# Patient Record
Sex: FEMALE | Race: WHITE | Employment: FULL TIME | URBAN - METROPOLITAN AREA
[De-identification: names, ages, dates, MRNs, and addresses within clinical notes are randomized per-mention and may not be internally consistent; named-entity substitution may affect disease eponyms.]

---

## 2017-08-17 PROBLEM — K80.50 CHOLEDOCHOLITHIASIS: Status: ACTIVE | Noted: 2017-08-17

## 2017-08-17 PROBLEM — K83.1 COMMON BILE DUCT (CBD) STRICTURE: Status: ACTIVE | Noted: 2017-08-17

## 2017-08-17 PROBLEM — K29.60 SUPERFICIAL GASTRITIS: Status: ACTIVE | Noted: 2017-08-17

## 2018-09-13 ENCOUNTER — APPOINTMENT (OUTPATIENT)
Dept: CT IMAGING | Age: 58
DRG: 440 | End: 2018-09-13
Payer: COMMERCIAL

## 2018-09-13 ENCOUNTER — HOSPITAL ENCOUNTER (INPATIENT)
Age: 58
LOS: 6 days | Discharge: HOME | DRG: 440 | End: 2018-09-19
Attending: EMERGENCY MEDICINE | Admitting: INTERNAL MEDICINE
Payer: COMMERCIAL

## 2018-09-13 DIAGNOSIS — K85.82 OTHER ACUTE PANCREATITIS WITH INFECTED NECROSIS: Primary | ICD-10-CM

## 2018-09-13 PROBLEM — K85.90 ACUTE RECURRENT PANCREATITIS: Status: ACTIVE | Noted: 2018-09-13

## 2018-09-13 PROBLEM — K86.1 CHRONIC RELAPSING PANCREATITIS (HCC): Status: ACTIVE | Noted: 2018-09-13

## 2018-09-13 LAB
ALBUMIN SERPL-MCNC: 4.4 G/DL (ref 3.5–5.2)
ALP BLD-CCNC: 74 U/L (ref 35–104)
ALT SERPL-CCNC: 11 U/L (ref 0–32)
AMORPHOUS: NORMAL
AMYLASE: 103 U/L (ref 20–100)
ANION GAP SERPL CALCULATED.3IONS-SCNC: 10 MMOL/L (ref 7–16)
AST SERPL-CCNC: 18 U/L (ref 0–31)
BACTERIA: NORMAL /HPF
BASOPHILS ABSOLUTE: 0.07 E9/L (ref 0–0.2)
BASOPHILS RELATIVE PERCENT: 0.8 % (ref 0–2)
BILIRUB SERPL-MCNC: 0.5 MG/DL (ref 0–1.2)
BILIRUBIN URINE: NEGATIVE
BLOOD, URINE: NEGATIVE
BUN BLDV-MCNC: 15 MG/DL (ref 6–20)
CALCIUM SERPL-MCNC: 10 MG/DL (ref 8.6–10.2)
CHLORIDE BLD-SCNC: 102 MMOL/L (ref 98–107)
CLARITY: NORMAL
CO2: 27 MMOL/L (ref 22–29)
COLOR: YELLOW
CREAT SERPL-MCNC: 1 MG/DL (ref 0.5–1)
EOSINOPHILS ABSOLUTE: 0.2 E9/L (ref 0.05–0.5)
EOSINOPHILS RELATIVE PERCENT: 2.2 % (ref 0–6)
FOLATE: 10.4 NG/ML (ref 4.8–24.2)
GFR AFRICAN AMERICAN: >60
GFR NON-AFRICAN AMERICAN: 57 ML/MIN/1.73
GLUCOSE BLD-MCNC: 89 MG/DL (ref 74–109)
GLUCOSE URINE: NEGATIVE MG/DL
HCT VFR BLD CALC: 43 % (ref 34–48)
HEMOGLOBIN: 14.5 G/DL (ref 11.5–15.5)
IMMATURE GRANULOCYTES #: 0.04 E9/L
IMMATURE GRANULOCYTES %: 0.4 % (ref 0–5)
KETONES, URINE: NEGATIVE MG/DL
LACTIC ACID: 0.7 MMOL/L (ref 0.5–2.2)
LEUKOCYTE ESTERASE, URINE: NEGATIVE
LIPASE: 237 U/L (ref 13–60)
LYMPHOCYTES ABSOLUTE: 3.78 E9/L (ref 1.5–4)
LYMPHOCYTES RELATIVE PERCENT: 41.3 % (ref 20–42)
MCH RBC QN AUTO: 31.7 PG (ref 26–35)
MCHC RBC AUTO-ENTMCNC: 33.7 % (ref 32–34.5)
MCV RBC AUTO: 94.1 FL (ref 80–99.9)
METER GLUCOSE: 85 MG/DL (ref 70–110)
MONOCYTES ABSOLUTE: 0.49 E9/L (ref 0.1–0.95)
MONOCYTES RELATIVE PERCENT: 5.3 % (ref 2–12)
NEUTROPHILS ABSOLUTE: 4.58 E9/L (ref 1.8–7.3)
NEUTROPHILS RELATIVE PERCENT: 50 % (ref 43–80)
NITRITE, URINE: NEGATIVE
PDW BLD-RTO: 13.4 FL (ref 11.5–15)
PH UA: 7.5 (ref 5–9)
PHOSPHORUS: 3.3 MG/DL (ref 2.5–4.5)
PLATELET # BLD: 236 E9/L (ref 130–450)
PMV BLD AUTO: 9.9 FL (ref 7–12)
POTASSIUM SERPL-SCNC: 4.6 MMOL/L (ref 3.5–5)
PRO-BNP: 20 PG/ML (ref 0–125)
PROTEIN UA: NEGATIVE MG/DL
RBC # BLD: 4.57 E12/L (ref 3.5–5.5)
RBC UA: NORMAL /HPF (ref 0–2)
SODIUM BLD-SCNC: 139 MMOL/L (ref 132–146)
SPECIFIC GRAVITY UA: 1.01 (ref 1–1.03)
TOTAL PROTEIN: 7.3 G/DL (ref 6.4–8.3)
UROBILINOGEN, URINE: 0.2 E.U./DL
VITAMIN B-12: 867 PG/ML (ref 211–946)
WBC # BLD: 9.2 E9/L (ref 4.5–11.5)
WBC UA: NORMAL /HPF (ref 0–5)

## 2018-09-13 PROCEDURE — 80053 COMPREHEN METABOLIC PANEL: CPT

## 2018-09-13 PROCEDURE — 85025 COMPLETE CBC W/AUTO DIFF WBC: CPT

## 2018-09-13 PROCEDURE — 6360000002 HC RX W HCPCS: Performed by: EMERGENCY MEDICINE

## 2018-09-13 PROCEDURE — 87040 BLOOD CULTURE FOR BACTERIA: CPT

## 2018-09-13 PROCEDURE — 36415 COLL VENOUS BLD VENIPUNCTURE: CPT

## 2018-09-13 PROCEDURE — 83690 ASSAY OF LIPASE: CPT

## 2018-09-13 PROCEDURE — 82607 VITAMIN B-12: CPT

## 2018-09-13 PROCEDURE — 82962 GLUCOSE BLOOD TEST: CPT

## 2018-09-13 PROCEDURE — 6360000002 HC RX W HCPCS: Performed by: INTERNAL MEDICINE

## 2018-09-13 PROCEDURE — 6370000000 HC RX 637 (ALT 250 FOR IP): Performed by: INTERNAL MEDICINE

## 2018-09-13 PROCEDURE — 1200000000 HC SEMI PRIVATE

## 2018-09-13 PROCEDURE — 96376 TX/PRO/DX INJ SAME DRUG ADON: CPT

## 2018-09-13 PROCEDURE — 6360000004 HC RX CONTRAST MEDICATION: Performed by: RADIOLOGY

## 2018-09-13 PROCEDURE — 96374 THER/PROPH/DIAG INJ IV PUSH: CPT

## 2018-09-13 PROCEDURE — 82746 ASSAY OF FOLIC ACID SERUM: CPT

## 2018-09-13 PROCEDURE — 81001 URINALYSIS AUTO W/SCOPE: CPT

## 2018-09-13 PROCEDURE — 84100 ASSAY OF PHOSPHORUS: CPT

## 2018-09-13 PROCEDURE — 83880 ASSAY OF NATRIURETIC PEPTIDE: CPT

## 2018-09-13 PROCEDURE — 74177 CT ABD & PELVIS W/CONTRAST: CPT

## 2018-09-13 PROCEDURE — 96375 TX/PRO/DX INJ NEW DRUG ADDON: CPT

## 2018-09-13 PROCEDURE — 2580000003 HC RX 258: Performed by: INTERNAL MEDICINE

## 2018-09-13 PROCEDURE — 82150 ASSAY OF AMYLASE: CPT

## 2018-09-13 PROCEDURE — 99285 EMERGENCY DEPT VISIT HI MDM: CPT

## 2018-09-13 PROCEDURE — 83605 ASSAY OF LACTIC ACID: CPT

## 2018-09-13 RX ORDER — DICYCLOMINE HYDROCHLORIDE 10 MG/1
20 CAPSULE ORAL 3 TIMES DAILY PRN
Status: DISCONTINUED | OUTPATIENT
Start: 2018-09-13 | End: 2018-09-19 | Stop reason: HOSPADM

## 2018-09-13 RX ORDER — DEXTROSE MONOHYDRATE 50 MG/ML
100 INJECTION, SOLUTION INTRAVENOUS PRN
Status: DISCONTINUED | OUTPATIENT
Start: 2018-09-13 | End: 2018-09-19 | Stop reason: HOSPADM

## 2018-09-13 RX ORDER — NICOTINE POLACRILEX 4 MG
15 LOZENGE BUCCAL PRN
Status: DISCONTINUED | OUTPATIENT
Start: 2018-09-13 | End: 2018-09-19 | Stop reason: HOSPADM

## 2018-09-13 RX ORDER — SODIUM CHLORIDE 9 MG/ML
INJECTION, SOLUTION INTRAVENOUS CONTINUOUS
Status: DISCONTINUED | OUTPATIENT
Start: 2018-09-13 | End: 2018-09-19 | Stop reason: HOSPADM

## 2018-09-13 RX ORDER — ONDANSETRON 2 MG/ML
4 INJECTION INTRAMUSCULAR; INTRAVENOUS ONCE
Status: COMPLETED | OUTPATIENT
Start: 2018-09-13 | End: 2018-09-13

## 2018-09-13 RX ORDER — FENTANYL CITRATE 50 UG/ML
50 INJECTION, SOLUTION INTRAMUSCULAR; INTRAVENOUS ONCE
Status: COMPLETED | OUTPATIENT
Start: 2018-09-13 | End: 2018-09-13

## 2018-09-13 RX ORDER — MORPHINE SULFATE 2 MG/ML
2 INJECTION, SOLUTION INTRAMUSCULAR; INTRAVENOUS
Status: DISCONTINUED | OUTPATIENT
Start: 2018-09-13 | End: 2018-09-19 | Stop reason: HOSPADM

## 2018-09-13 RX ORDER — DEXTROSE MONOHYDRATE 25 G/50ML
12.5 INJECTION, SOLUTION INTRAVENOUS PRN
Status: DISCONTINUED | OUTPATIENT
Start: 2018-09-13 | End: 2018-09-19 | Stop reason: HOSPADM

## 2018-09-13 RX ORDER — ONDANSETRON 2 MG/ML
4 INJECTION INTRAMUSCULAR; INTRAVENOUS EVERY 6 HOURS PRN
Status: DISCONTINUED | OUTPATIENT
Start: 2018-09-13 | End: 2018-09-19 | Stop reason: HOSPADM

## 2018-09-13 RX ORDER — FENTANYL CITRATE 50 UG/ML
50 INJECTION, SOLUTION INTRAMUSCULAR; INTRAVENOUS EVERY 4 HOURS PRN
Status: DISCONTINUED | OUTPATIENT
Start: 2018-09-13 | End: 2018-09-13

## 2018-09-13 RX ORDER — NICOTINE 21 MG/24HR
1 PATCH, TRANSDERMAL 24 HOURS TRANSDERMAL DAILY
Status: DISCONTINUED | OUTPATIENT
Start: 2018-09-13 | End: 2018-09-19 | Stop reason: HOSPADM

## 2018-09-13 RX ORDER — ONDANSETRON 4 MG/1
4 TABLET, ORALLY DISINTEGRATING ORAL EVERY 8 HOURS PRN
Status: DISCONTINUED | OUTPATIENT
Start: 2018-09-13 | End: 2018-09-19 | Stop reason: HOSPADM

## 2018-09-13 RX ORDER — PANTOPRAZOLE SODIUM 40 MG/1
40 TABLET, DELAYED RELEASE ORAL DAILY
Status: DISCONTINUED | OUTPATIENT
Start: 2018-09-13 | End: 2018-09-14

## 2018-09-13 RX ADMIN — PANTOPRAZOLE SODIUM 40 MG: 40 TABLET, DELAYED RELEASE ORAL at 18:58

## 2018-09-13 RX ADMIN — FENTANYL CITRATE 50 MCG: 50 INJECTION, SOLUTION INTRAMUSCULAR; INTRAVENOUS at 18:57

## 2018-09-13 RX ADMIN — ONDANSETRON HYDROCHLORIDE 4 MG: 2 INJECTION, SOLUTION INTRAMUSCULAR; INTRAVENOUS at 18:57

## 2018-09-13 RX ADMIN — ENOXAPARIN SODIUM 40 MG: 40 INJECTION SUBCUTANEOUS at 18:58

## 2018-09-13 RX ADMIN — MORPHINE SULFATE 2 MG: 2 INJECTION, SOLUTION INTRAMUSCULAR; INTRAVENOUS at 21:19

## 2018-09-13 RX ADMIN — FENTANYL CITRATE 50 MCG: 50 INJECTION INTRAMUSCULAR; INTRAVENOUS at 15:38

## 2018-09-13 RX ADMIN — MORPHINE SULFATE 2 MG: 2 INJECTION, SOLUTION INTRAMUSCULAR; INTRAVENOUS at 23:31

## 2018-09-13 RX ADMIN — IOPAMIDOL 110 ML: 755 INJECTION, SOLUTION INTRAVENOUS at 17:20

## 2018-09-13 RX ADMIN — FENTANYL CITRATE 50 MCG: 50 INJECTION, SOLUTION INTRAMUSCULAR; INTRAVENOUS at 17:08

## 2018-09-13 RX ADMIN — SODIUM CHLORIDE: 9 INJECTION, SOLUTION INTRAVENOUS at 18:58

## 2018-09-13 RX ADMIN — ONDANSETRON 4 MG: 2 INJECTION INTRAMUSCULAR; INTRAVENOUS at 15:38

## 2018-09-13 ASSESSMENT — PAIN DESCRIPTION - PAIN TYPE
TYPE: ACUTE PAIN

## 2018-09-13 ASSESSMENT — PAIN DESCRIPTION - ORIENTATION
ORIENTATION: LEFT;UPPER

## 2018-09-13 ASSESSMENT — PAIN DESCRIPTION - LOCATION
LOCATION: ABDOMEN

## 2018-09-13 ASSESSMENT — PAIN DESCRIPTION - PROGRESSION
CLINICAL_PROGRESSION: NOT CHANGED

## 2018-09-13 ASSESSMENT — PAIN DESCRIPTION - ONSET
ONSET: ON-GOING

## 2018-09-13 ASSESSMENT — PAIN SCALES - GENERAL
PAINLEVEL_OUTOF10: 7
PAINLEVEL_OUTOF10: 7
PAINLEVEL_OUTOF10: 10
PAINLEVEL_OUTOF10: 7
PAINLEVEL_OUTOF10: 7

## 2018-09-13 ASSESSMENT — PAIN DESCRIPTION - FREQUENCY
FREQUENCY: CONTINUOUS
FREQUENCY: INTERMITTENT
FREQUENCY: CONTINUOUS

## 2018-09-13 ASSESSMENT — PAIN DESCRIPTION - DESCRIPTORS
DESCRIPTORS: ACHING;DISCOMFORT
DESCRIPTORS: ACHING;DISCOMFORT;SORE
DESCRIPTORS: ACHING;DISCOMFORT

## 2018-09-13 NOTE — ED PROVIDER NOTES
Department of Emergency Medicine   ED  Provider Note  Admit Date/RoomTime: 9/13/2018  2:13 PM  ED Room: 0511/0511-A    HPI:   Beatrice Muhammad is a 66 y.o. female presenting to the ED for abd pain, beginning 2 days ago. The complaint has been constant, moderate in severity, and worsened by nothing. Pt states the pain is focal to her LUQ and does not radiate. She also c/o nausea. She denies HA, chest pain, SOB, fever, chills, emesis, diarrhea, dysuria, hematuria, back pain, and melena. Pt follows with Dr. Vargas Aguirre for GI.     ROS:   Pertinent positives and negatives are stated within HPI, all other systems reviewed and are negative.    --------------------------------------------- PAST HISTORY ---------------------------------------------  Past Medical History:  has a past medical history of Livingston's esophagus; CAD (coronary artery disease); Cancer ([facility] [address] Diverticulitis of sigmoid colon; Elevated lipase; Gastric ulcer; GERD (gastroesophageal reflux disease); Hypoglycemia ; MI (myocardial infarction) ([facility] [address] Peptic ulcer disease; Relapsing pancreatitis ([facility] [address] Thyroid disease; and Ulcer. Past Surgical History:  has a past surgical history that includes Hysterectomy (age 27); Cardiac surgery; ECHO Complete 2D W Doppler W Color (1/16/2013); Coronary angioplasty; [place] tooth extraction; Endoscopy, colon, diagnostic; Colonoscopy (05/2014); Cholecystectomy (2014); Upper gastrointestinal endoscopy (08/14/2017); ERCP (08/2017); and partial nephrectomy (1991). Social History:  reports that she has been smoking Cigarettes. She started smoking about 36 years ago. She has a 40.00 pack-year smoking history. She has never used smokeless tobacco. She reports that she drinks alcohol. She reports that she does not use drugs. Family History: family history includes Cancer in her father; Diabetes in her mother; [place] in her father; Thyroid Disease in her mother.      The patients home medications have been reviewed. Allergies: Darvocet [propoxyphene n-acetaminophen]; Darvon [propoxyphene hcl];  Percocet [oxycodone-acetaminophen]; and Dilaudid [hydromorphone hcl]    -------------------------------------------------- RESULTS -------------------------------------------------  All laboratory and radiology results have been personally reviewed by myself   LABS:  Results for orders placed or performed during the hospital encounter of 09/13/18   Culture Blood #1   Result Value Ref Range    Blood Culture, Routine 5 Days- no growth    CBC Auto Differential   Result Value Ref Range    WBC 9.2 4.5 - 11.5 E9/L    RBC 4.57 3.50 - 5.50 E12/L    Hemoglobin 14.5 11.5 - 15.5 g/dL    Hematocrit 43.0 34.0 - 48.0 %    MCV 94.1 80.0 - 99.9 fL    MCH 31.7 26.0 - 35.0 pg    MCHC 33.7 32.0 - 34.5 %    RDW 13.4 11.5 - 15.0 fL    Platelets 784 592 - 846 E9/L    MPV 9.9 7.0 - 12.0 fL    Neutrophils % 50.0 43.0 - 80.0 %    Immature Granulocytes % 0.4 0.0 - 5.0 %    Lymphocytes % 41.3 20.0 - 42.0 %    Monocytes % 5.3 2.0 - 12.0 %    Eosinophils % 2.2 0.0 - 6.0 %    Basophils % 0.8 0.0 - 2.0 %    Neutrophils # 4.58 1.80 - 7.30 E9/L    Immature Granulocytes # 0.04 E9/L    Lymphocytes # 3.78 1.50 - 4.00 E9/L    Monocytes # 0.49 0.10 - 0.95 E9/L    Eosinophils # 0.20 0.05 - 0.50 E9/L    Basophils # 0.07 0.00 - 0.20 E9/L   Comprehensive Metabolic Panel   Result Value Ref Range    Sodium 139 132 - 146 mmol/L    Potassium 4.6 3.5 - 5.0 mmol/L    Chloride 102 98 - 107 mmol/L    CO2 27 22 - 29 mmol/L    Anion Gap 10 7 - 16 mmol/L    Glucose 89 74 - 109 mg/dL    BUN 15 6 - 20 mg/dL    CREATININE 1.0 0.5 - 1.0 mg/dL    GFR Non-African American 57 >=60 mL/min/1.73    GFR African American >60     Calcium 10.0 8.6 - 10.2 mg/dL    Total Protein 7.3 6.4 - 8.3 g/dL    Alb 4.4 3.5 - 5.2 g/dL    Total Bilirubin 0.5 0.0 - 1.2 mg/dL    Alkaline Phosphatase 74 35 - 104 U/L    ALT 11 0 - 32 U/L    AST 18 0 - 31 U/L   Lipase   Result Value Ref Range    Lipase 237 (H) 0.5 0.0 - 1.2 mg/dL    Alkaline Phosphatase 65 35 - 104 U/L    ALT 10 0 - 32 U/L    AST 14 0 - 31 U/L   CBC Auto Differential   Result Value Ref Range    WBC 7.6 4.5 - 11.5 E9/L    RBC 4.06 3.50 - 5.50 E12/L    Hemoglobin 12.9 11.5 - 15.5 g/dL    Hematocrit 38.4 34.0 - 48.0 %    MCV 94.6 80.0 - 99.9 fL    MCH 31.8 26.0 - 35.0 pg    MCHC 33.6 32.0 - 34.5 %    RDW 12.9 11.5 - 15.0 fL    Platelets 005 883 - 842 E9/L    MPV 10.1 7.0 - 12.0 fL    Neutrophils % 59.6 43.0 - 80.0 %    Immature Granulocytes % 0.8 0.0 - 5.0 %    Lymphocytes % 31.6 20.0 - 42.0 %    Monocytes % 4.7 2.0 - 12.0 %    Eosinophils % 2.6 0.0 - 6.0 %    Basophils % 0.7 0.0 - 2.0 %    Neutrophils # 4.52 1.80 - 7.30 E9/L    Immature Granulocytes # 0.06 E9/L    Lymphocytes # 2.40 1.50 - 4.00 E9/L    Monocytes # 0.36 0.10 - 0.95 E9/L    Eosinophils # 0.20 0.05 - 0.50 E9/L    Basophils # 0.05 0.00 - 0.20 E9/L   Amylase   Result Value Ref Range    Amylase 71 20 - 100 U/L   C-Reactive Protein   Result Value Ref Range    CRP 0.3 0.0 - 0.4 mg/dL   Lipase   Result Value Ref Range    Lipase 19 13 - 60 U/L   Comprehensive metabolic panel   Result Value Ref Range    Sodium 139 132 - 146 mmol/L    Potassium 4.2 3.5 - 5.0 mmol/L    Chloride 107 98 - 107 mmol/L    CO2 24 22 - 29 mmol/L    Anion Gap 8 7 - 16 mmol/L    Glucose 88 74 - 109 mg/dL    BUN 8 6 - 20 mg/dL    CREATININE 0.9 0.5 - 1.0 mg/dL    GFR Non-African American >60 >=60 mL/min/1.73    GFR African American >60     Calcium 8.3 (L) 8.6 - 10.2 mg/dL    Total Protein 5.7 (L) 6.4 - 8.3 g/dL    Alb 3.2 (L) 3.5 - 5.2 g/dL    Total Bilirubin 0.4 0.0 - 1.2 mg/dL    Alkaline Phosphatase 59 35 - 104 U/L    ALT 10 0 - 32 U/L    AST 16 0 - 31 U/L   CBC Auto Differential   Result Value Ref Range    WBC 6.4 4.5 - 11.5 E9/L    RBC 3.91 3.50 - 5.50 E12/L    Hemoglobin 12.4 11.5 - 15.5 g/dL    Hematocrit 36.5 34.0 - 48.0 %    MCV 93.4 80.0 - 99.9 fL    MCH 31.7 26.0 - 35.0 pg    MCHC 34.0 32.0 - 34.5 %    RDW 13.0 11.5 - Eosinophils # 0.20 0.05 - 0.50 E9/L    Basophils # 0.03 0.00 - 0.20 E9/L   C-Reactive Protein   Result Value Ref Range    CRP 1.4 (H) 0.0 - 0.4 mg/dL   Lipase   Result Value Ref Range    Lipase 11 (L) 13 - 60 U/L   Comprehensive metabolic panel   Result Value Ref Range    Sodium 143 132 - 146 mmol/L    Potassium 4.0 3.5 - 5.0 mmol/L    Chloride 107 98 - 107 mmol/L    CO2 26 22 - 29 mmol/L    Anion Gap 10 7 - 16 mmol/L    Glucose 84 74 - 109 mg/dL    BUN 5 (L) 6 - 20 mg/dL    CREATININE 0.8 0.5 - 1.0 mg/dL    GFR Non-African American >60 >=60 mL/min/1.73    GFR African American >60     Calcium 8.7 8.6 - 10.2 mg/dL    Total Protein 5.9 (L) 6.4 - 8.3 g/dL    Alb 3.3 (L) 3.5 - 5.2 g/dL    Total Bilirubin 0.5 0.0 - 1.2 mg/dL    Alkaline Phosphatase 62 35 - 104 U/L    ALT 12 0 - 32 U/L    AST 14 0 - 31 U/L   CBC Auto Differential   Result Value Ref Range    WBC 7.1 4.5 - 11.5 E9/L    RBC 3.92 3.50 - 5.50 E12/L    Hemoglobin 12.4 11.5 - 15.5 g/dL    Hematocrit 36.4 34.0 - 48.0 %    MCV 92.9 80.0 - 99.9 fL    MCH 31.6 26.0 - 35.0 pg    MCHC 34.1 32.0 - 34.5 %    RDW 13.0 11.5 - 15.0 fL    Platelets 440 696 - 453 E9/L    MPV 10.1 7.0 - 12.0 fL    Neutrophils % 58.3 43.0 - 80.0 %    Immature Granulocytes % 0.3 0.0 - 5.0 %    Lymphocytes % 32.6 20.0 - 42.0 %    Monocytes % 4.8 2.0 - 12.0 %    Eosinophils % 3.4 0.0 - 6.0 %    Basophils % 0.6 0.0 - 2.0 %    Neutrophils # 4.13 1.80 - 7.30 E9/L    Immature Granulocytes # 0.02 E9/L    Lymphocytes # 2.31 1.50 - 4.00 E9/L    Monocytes # 0.34 0.10 - 0.95 E9/L    Eosinophils # 0.24 0.05 - 0.50 E9/L    Basophils # 0.04 0.00 - 0.20 E9/L   POCT Glucose   Result Value Ref Range    Meter Glucose 85 70 - 110 mg/dL   POCT Glucose   Result Value Ref Range    Meter Glucose 82 70 - 110 mg/dL   POCT Glucose   Result Value Ref Range    Meter Glucose 74 70 - 110 mg/dL   POCT Glucose   Result Value Ref Range    Meter Glucose 80 70 - 110 mg/dL   POCT Glucose   Result Value Ref Range    Meter No definite enhancing lesion   is seen                CT ABDOMEN PELVIS W IV CONTRAST Additional Contrast? None   Final Result      Dilatation of the intrahepatic, extrahepatic ducts, common bile duct   and the pancreatic duct. Please correlate with LFTs and consider   further evaluation as clinically warranted. No peripancreatic fluid collections or edema this time on this   examination.        ------------------------- NURSING NOTES AND VITALS REVIEWED ---------------------------   The nursing notes within the ED encounter and vital signs as below have been reviewed. /72   Pulse 71   Temp 98.5 °F (36.9 °C) (Oral)   Resp 16   Ht 5' 4\" (1.626 m)   Wt 147 lb 4 oz (66.8 kg)   SpO2 94%   BMI 25.28 kg/m²   Oxygen Saturation Interpretation: Normal    ---------------------------------------------------PHYSICAL EXAM--------------------------------------    Constitutional/General: Alert and oriented x3, well appearing, non toxic in NAD. Afebrile. Head: NC/AT  Eyes: PERRL, EOMI  HENT: Oropharynx clear. Handling secretions. Neck: Supple, full ROM  Pulmonary: Lungs clear to auscultation bilaterally, no wheezes, rales, or rhonchi. Not in respiratory distress. Cardiovascular: Regular rate. Regular rhythm. No murmurs. No gallops, or rubs. 2+ distal pulses. Abdomen: Soft, epigastric and LUQ tenderness, non distended. No guarding, rebound, or rigidity. Extremities: Moves all extremities x 4. Warm and well perfused. No edema. Skin: Warm and dry without rash. Back: No CVA tenderness. Neurologic: GCS 15, no focal deficits, systemic strength 5/5 to all extremities symmetrically, CN II-XII grossly intact.   Psych: Speech and behavior appropriate.     ------------------------------ ED COURSE/MEDICAL DECISION MAKING----------------------  Medications   LORazepam (ATIVAN) injection 1 mg (1 mg Intravenous Not Given 9/14/18 1827)   ondansetron (ZOFRAN) injection 4 mg (4 mg Intravenous Given 9/13/18 5668)

## 2018-09-14 ENCOUNTER — APPOINTMENT (OUTPATIENT)
Dept: MRI IMAGING | Age: 58
DRG: 440 | End: 2018-09-14
Payer: COMMERCIAL

## 2018-09-14 LAB
ALBUMIN SERPL-MCNC: 3.3 G/DL (ref 3.5–5.2)
ALP BLD-CCNC: 61 U/L (ref 35–104)
ALT SERPL-CCNC: 10 U/L (ref 0–32)
ANION GAP SERPL CALCULATED.3IONS-SCNC: 10 MMOL/L (ref 7–16)
AST SERPL-CCNC: 13 U/L (ref 0–31)
BASOPHILS ABSOLUTE: 0.07 E9/L (ref 0–0.2)
BASOPHILS RELATIVE PERCENT: 0.9 % (ref 0–2)
BILIRUB SERPL-MCNC: 0.5 MG/DL (ref 0–1.2)
BUN BLDV-MCNC: 14 MG/DL (ref 6–20)
C-REACTIVE PROTEIN: 0.2 MG/DL (ref 0–0.4)
CALCIUM SERPL-MCNC: 8.6 MG/DL (ref 8.6–10.2)
CHLORIDE BLD-SCNC: 105 MMOL/L (ref 98–107)
CHOLESTEROL, TOTAL: 139 MG/DL (ref 0–199)
CO2: 23 MMOL/L (ref 22–29)
CREAT SERPL-MCNC: 1 MG/DL (ref 0.5–1)
EOSINOPHILS ABSOLUTE: 0.24 E9/L (ref 0.05–0.5)
EOSINOPHILS RELATIVE PERCENT: 3.1 % (ref 0–6)
GFR AFRICAN AMERICAN: >60
GFR NON-AFRICAN AMERICAN: 57 ML/MIN/1.73
GLUCOSE BLD-MCNC: 83 MG/DL (ref 74–109)
HCT VFR BLD CALC: 38.2 % (ref 34–48)
HDLC SERPL-MCNC: 30 MG/DL
HEMOGLOBIN: 12.8 G/DL (ref 11.5–15.5)
IMMATURE GRANULOCYTES #: 0.03 E9/L
IMMATURE GRANULOCYTES %: 0.4 % (ref 0–5)
LDL CHOLESTEROL CALCULATED: 84 MG/DL (ref 0–99)
LIPASE: 24 U/L (ref 13–60)
LYMPHOCYTES ABSOLUTE: 4.07 E9/L (ref 1.5–4)
LYMPHOCYTES RELATIVE PERCENT: 52.2 % (ref 20–42)
MAGNESIUM: 2.4 MG/DL (ref 1.6–2.6)
MCH RBC QN AUTO: 32 PG (ref 26–35)
MCHC RBC AUTO-ENTMCNC: 33.5 % (ref 32–34.5)
MCV RBC AUTO: 95.5 FL (ref 80–99.9)
METER GLUCOSE: 74 MG/DL (ref 70–110)
METER GLUCOSE: 80 MG/DL (ref 70–110)
METER GLUCOSE: 82 MG/DL (ref 70–110)
MONOCYTES ABSOLUTE: 0.39 E9/L (ref 0.1–0.95)
MONOCYTES RELATIVE PERCENT: 5 % (ref 2–12)
NEUTROPHILS ABSOLUTE: 3 E9/L (ref 1.8–7.3)
NEUTROPHILS RELATIVE PERCENT: 38.4 % (ref 43–80)
PDW BLD-RTO: 13.5 FL (ref 11.5–15)
PLATELET # BLD: 211 E9/L (ref 130–450)
PMV BLD AUTO: 10.1 FL (ref 7–12)
POTASSIUM SERPL-SCNC: 3.9 MMOL/L (ref 3.5–5)
RBC # BLD: 4 E12/L (ref 3.5–5.5)
SODIUM BLD-SCNC: 138 MMOL/L (ref 132–146)
TOTAL PROTEIN: 6 G/DL (ref 6.4–8.3)
TRIGL SERPL-MCNC: 123 MG/DL (ref 0–149)
TSH SERPL DL<=0.05 MIU/L-ACNC: 0.9 UIU/ML (ref 0.27–4.2)
VLDLC SERPL CALC-MCNC: 25 MG/DL
WBC # BLD: 7.8 E9/L (ref 4.5–11.5)

## 2018-09-14 PROCEDURE — 36415 COLL VENOUS BLD VENIPUNCTURE: CPT

## 2018-09-14 PROCEDURE — 74183 MRI ABD W/O CNTR FLWD CNTR: CPT

## 2018-09-14 PROCEDURE — 6360000002 HC RX W HCPCS: Performed by: INTERNAL MEDICINE

## 2018-09-14 PROCEDURE — 6370000000 HC RX 637 (ALT 250 FOR IP): Performed by: INTERNAL MEDICINE

## 2018-09-14 PROCEDURE — 85025 COMPLETE CBC W/AUTO DIFF WBC: CPT

## 2018-09-14 PROCEDURE — 1200000000 HC SEMI PRIVATE

## 2018-09-14 PROCEDURE — 6360000004 HC RX CONTRAST MEDICATION: Performed by: RADIOLOGY

## 2018-09-14 PROCEDURE — A9579 GAD-BASE MR CONTRAST NOS,1ML: HCPCS | Performed by: RADIOLOGY

## 2018-09-14 PROCEDURE — 80061 LIPID PANEL: CPT

## 2018-09-14 PROCEDURE — 82962 GLUCOSE BLOOD TEST: CPT

## 2018-09-14 PROCEDURE — 80053 COMPREHEN METABOLIC PANEL: CPT

## 2018-09-14 PROCEDURE — 84443 ASSAY THYROID STIM HORMONE: CPT

## 2018-09-14 PROCEDURE — 2580000003 HC RX 258: Performed by: INTERNAL MEDICINE

## 2018-09-14 PROCEDURE — 83735 ASSAY OF MAGNESIUM: CPT

## 2018-09-14 PROCEDURE — 86140 C-REACTIVE PROTEIN: CPT

## 2018-09-14 PROCEDURE — 83690 ASSAY OF LIPASE: CPT

## 2018-09-14 RX ORDER — LORAZEPAM 2 MG/ML
1 INJECTION INTRAMUSCULAR
Status: DISCONTINUED | OUTPATIENT
Start: 2018-09-15 | End: 2018-09-14

## 2018-09-14 RX ORDER — LORAZEPAM 2 MG/ML
1 INJECTION INTRAMUSCULAR
Status: COMPLETED | OUTPATIENT
Start: 2018-09-14 | End: 2018-09-14

## 2018-09-14 RX ORDER — PANTOPRAZOLE SODIUM 40 MG/1
40 TABLET, DELAYED RELEASE ORAL
Status: DISCONTINUED | OUTPATIENT
Start: 2018-09-14 | End: 2018-09-19 | Stop reason: HOSPADM

## 2018-09-14 RX ORDER — SUCRALFATE 1 G/1
1 TABLET ORAL EVERY 6 HOURS SCHEDULED
Status: DISCONTINUED | OUTPATIENT
Start: 2018-09-14 | End: 2018-09-19 | Stop reason: HOSPADM

## 2018-09-14 RX ORDER — LORAZEPAM 2 MG/ML
1 INJECTION INTRAMUSCULAR
Status: ACTIVE | OUTPATIENT
Start: 2018-09-14 | End: 2018-09-15

## 2018-09-14 RX ADMIN — LORAZEPAM 1 MG: 2 INJECTION INTRAMUSCULAR; INTRAVENOUS at 16:51

## 2018-09-14 RX ADMIN — MORPHINE SULFATE 2 MG: 2 INJECTION, SOLUTION INTRAMUSCULAR; INTRAVENOUS at 15:34

## 2018-09-14 RX ADMIN — GADOTERIDOL 12 ML: 279.3 INJECTION, SOLUTION INTRAVENOUS at 17:46

## 2018-09-14 RX ADMIN — PANTOPRAZOLE SODIUM 40 MG: 40 TABLET, DELAYED RELEASE ORAL at 18:42

## 2018-09-14 RX ADMIN — ONDANSETRON HYDROCHLORIDE 4 MG: 2 INJECTION, SOLUTION INTRAMUSCULAR; INTRAVENOUS at 03:55

## 2018-09-14 RX ADMIN — MORPHINE SULFATE 2 MG: 2 INJECTION, SOLUTION INTRAMUSCULAR; INTRAVENOUS at 18:41

## 2018-09-14 RX ADMIN — SODIUM CHLORIDE: 9 INJECTION, SOLUTION INTRAVENOUS at 04:00

## 2018-09-14 RX ADMIN — MORPHINE SULFATE 2 MG: 2 INJECTION, SOLUTION INTRAMUSCULAR; INTRAVENOUS at 11:02

## 2018-09-14 RX ADMIN — MORPHINE SULFATE 2 MG: 2 INJECTION, SOLUTION INTRAMUSCULAR; INTRAVENOUS at 22:41

## 2018-09-14 RX ADMIN — PANTOPRAZOLE SODIUM 40 MG: 40 TABLET, DELAYED RELEASE ORAL at 07:48

## 2018-09-14 RX ADMIN — SUCRALFATE 1 G: 1 TABLET ORAL at 12:02

## 2018-09-14 RX ADMIN — ONDANSETRON HYDROCHLORIDE 4 MG: 2 INJECTION, SOLUTION INTRAMUSCULAR; INTRAVENOUS at 11:02

## 2018-09-14 RX ADMIN — ENOXAPARIN SODIUM 40 MG: 40 INJECTION SUBCUTANEOUS at 07:48

## 2018-09-14 RX ADMIN — SUCRALFATE 1 G: 1 TABLET ORAL at 18:42

## 2018-09-14 RX ADMIN — MORPHINE SULFATE 2 MG: 2 INJECTION, SOLUTION INTRAMUSCULAR; INTRAVENOUS at 03:55

## 2018-09-14 RX ADMIN — MORPHINE SULFATE 2 MG: 2 INJECTION, SOLUTION INTRAMUSCULAR; INTRAVENOUS at 08:18

## 2018-09-14 RX ADMIN — ONDANSETRON HYDROCHLORIDE 4 MG: 2 INJECTION, SOLUTION INTRAMUSCULAR; INTRAVENOUS at 18:25

## 2018-09-14 ASSESSMENT — PAIN SCALES - GENERAL
PAINLEVEL_OUTOF10: 3
PAINLEVEL_OUTOF10: 8
PAINLEVEL_OUTOF10: 7
PAINLEVEL_OUTOF10: 3
PAINLEVEL_OUTOF10: 8
PAINLEVEL_OUTOF10: 7
PAINLEVEL_OUTOF10: 7
PAINLEVEL_OUTOF10: 8

## 2018-09-14 ASSESSMENT — PAIN DESCRIPTION - ONSET
ONSET: ON-GOING
ONSET: AWAKENED FROM SLEEP

## 2018-09-14 ASSESSMENT — PAIN DESCRIPTION - LOCATION
LOCATION: ABDOMEN
LOCATION: ABDOMEN;HEAD

## 2018-09-14 ASSESSMENT — PAIN DESCRIPTION - ORIENTATION
ORIENTATION: LEFT;LOWER
ORIENTATION: LEFT;LOWER;UPPER

## 2018-09-14 ASSESSMENT — PAIN DESCRIPTION - PAIN TYPE
TYPE: ACUTE PAIN
TYPE: ACUTE PAIN

## 2018-09-14 ASSESSMENT — PAIN DESCRIPTION - PROGRESSION
CLINICAL_PROGRESSION: NOT CHANGED
CLINICAL_PROGRESSION: NOT CHANGED

## 2018-09-14 ASSESSMENT — PAIN DESCRIPTION - DESCRIPTORS
DESCRIPTORS: ACHING;DISCOMFORT;HEADACHE
DESCRIPTORS: ACHING;DISCOMFORT;SORE

## 2018-09-14 ASSESSMENT — PAIN DESCRIPTION - FREQUENCY
FREQUENCY: INTERMITTENT
FREQUENCY: INTERMITTENT

## 2018-09-14 NOTE — PLAN OF CARE
Problem: Pain:  Goal: Pain level will decrease  Pain level will decrease   Outcome: Not Met This Shift      Problem: Nausea/Vomiting:  Goal: Absence of nausea/vomiting  Absence of nausea/vomiting  Outcome: Not Met This Shift

## 2018-09-14 NOTE — CONSULTS
Gastroenterology Consult Note   Manolo Shepherd NP-C with Chely Daniel M.D. Consult Note        Date of Service: 9/14/2018  Reason for Consult: CHR relapsing pancreatitis  Requesting Physician: Dr Devendra Sims:  LUQ and epigastric pain    History Obtained From:  patient, electronic medical record    HISTORY OF PRESENT ILLNESS:       Hortencia Watson is a 66 y.o. female with significant past medical history of PUD, recurrent pancreatitis, gastric ulcer, CAD with MI, diverticulitis, diverticulosis, Livingston's esophagus, GERD, hypothyroidism, renal cell CA S/P partial nephrectomy in 1991 admitted via ED for LUQ and epigastric pain. Pt reports she often gets these symptoms on and off but for the past three days they have worsened. Reports the pain \"is like a really bad pain, like something is squeezing\". Rated the pain 10+/10 with associated nausea. Reports since this happens often, I\"I know not to eat or drink\", so she made herself NPO. Does not believe she ate or drank anything that set the symptoms off. Denies fever or chills, but reports she did have night sweats for 2 days. Reports her GB was removed about 5 yrs ago. Denies recent change in medications and reports she does not take any medications. Reports her appetite had been good prior to this episode and denies recent weight loss. Usually moves her bowels daily--regular formed, brown stool without melena or hematochezia. States she is \"thinking something is wrong with my immune system\". Admits to a rash that developed while in a tanning bed and then reoccurred while lying in the sun at a pool. States the rash was biopsied and she was told she needed to be checked for Lupus. Admission labs amylase 103, lipase 237, otherwise essentially negative. CT of abdomen/pelvis demonstrated dilatation of intrahepatic, extrahepatic ducts, CBD and pancreatic duct. Consultation for chronic relapsing pancreatitis.   Pt is  known to Dr. Mariia De Dios, last seen during hospital admission of 8/2017 for evaluation of ERCP. Our office scheduled the patient for outpatient EUS which was performed on 9/11/17 at [facility], [place], operated by [facility] demonstrating no significant abnomralities at that time. ERCP with papillotomy per Dr Rick Benavidez on 8/16/17 demonstrating CBD dilatation with distal stricute almost at the intrapapillary portion of CBD; papillotomy was done and three small filling defects consistent with three small stones were removed with balloon sweeping. Plans for EUS to evaluate double duct sign. EGD per Dr Rick Benavidez 8/14/17 demonstrating Grade B GERD, GastritisRetrieved in EPIC, pt had EGD 11/2/15 with Dr. Barbara Franklin which demonstrated moderate gastritis and moderate duodenitis. Duodenum, biopsy: Duodenal mucosa with normal appearing villous architecture and changes of acute and chronic/peptic duodenitis with focal lamina propria acute inflammation, foveolar metaplasia and Brunner's gland hyperplasia. Gastric antrum, biopsy: Antral type gastric mucosa, no significant pathologic abnormality identified. Negative for intestinal metaplasia and dysplasia. Immunostain negative for Helicobacter pylori organisms. Pt states she has hx of PUD stating she had EGD with Dr. Yodit Flores in ? 2016 - found in [facility], pt had EGD with Dr. Yodit Flores 8/15/14 which showed gastric ulcer with severe inflammation, noncompliance with medication - biopsy - Stomach, site not further specified, biopsy: Reactive gastropathy with mucosal erosion and ulcer bed/site with surface fibrinopurulent inflammatory exudate. Immunostain for H pylori organisms is negative. Stomach, biopsy:  Reactive gastropathy; negative for intestinal metaplasia. Immunostain negative for Helicobacter pylori organisms. Pt had prior EGD and Colonoscopy with Dr. Yodit Flores 5/23/14 (results not in EPIC, bx result in EPIC) - Gastroesophageal junction, biopsy:  Mild chronic active inflammation with focal intestinal metaplasia; negative for dysplasia.  Immunostain (ZOFRAN) injection 4 mg, 4 mg, Intravenous, Q6H PRN  morphine (PF) injection 2 mg, 2 mg, Intravenous, Q2H PRN    Allergies:  Darvocet [propoxyphene n-acetaminophen]; Darvon [propoxyphene hcl]; Percocet [oxycodone-acetaminophen]; and Dilaudid [hydromorphone hcl]    Social History:    Tobacco:  Pt reports she smokes cigarettes of 1 ppd since age 15. Alcohol:  Pt reports she drinks \"a couple beers a couple times a week. 6 beers is my maximum\"  Illicit Drugs: Pt reports she does not use illicit drugs. Family History: Mother , Sepsis; she had DM2, HTN, and Thyroid disease  Father , Lung CA - he was a smoker; had partial esophagus removed but from unknown cause  1 Brother living and healthy. 3 Daughters living; one daughter with Hx of diverticulitis S/P partial stomach and bowel removal.    REVIEW OF SYSTEMS:    Aside from what was mentioned in the PMH and HPI, essentially unremarkable, all others negative. PHYSICAL EXAM:      Vitals:    BP (!) 99/55   Pulse 66   Temp 98.4 °F (36.9 °C) (Oral)   Resp 16   Ht 5' 4\" (1.626 m)   Wt 135 lb 12.8 oz (61.6 kg)   SpO2 97%   BMI 23.31 kg/m²       CONSTITUTIONAL:  awake, alert, cooperative, no apparent distress, and appears stated age  EYES:  pupils equal, round and reactive to light, sclera anicteric and conjunctiva normal  ENT:  normocephalic, oral pharynx with moist mucous membranes  NECK:  supple   LUNGS:  No increased work of breathing, good air exchange, clear to auscultation bilaterally.   CARDIOVASCULAR:  Normal apical impulse, regular rate and rhythm, no murmur noted; 2+ pulses; without edema  ABDOMEN:   bowel sounds hypoactive, soft, non-distended, tender to palpation of epigastric and LUQ without guarding or rebound, no masses palpated, no hepatosplenomegally  MUSCULOSKELETAL:  full range of motion noted  motor strength is 5 out of 5 all extremities bilaterally  NEUROLOGIC:  Mental Status Exam:  Level of Alertness:   awake  Orientation: 2018     FOLATE:    Lab Results   Component Value Date    FOLATE 10.4 2018     BARRY:    Lab Results   Component Value Date    BARRY NEGATIVE 2016     No components found for: CHLPL  Lab Results   Component Value Date    TRIG 123 2018    TRIG 166 (H) 2016    TRIG 113 2013     Lab Results   Component Value Date    HDL 30 2018    HDL 27 2016    HDL 36.9 (A) 2013     Lab Results   Component Value Date    LDLCALC 84 2018    LDLCALC 138 (H) 2016    LDLCALC 149 (H) 2013     Lab Results   Component Value Date    LABVLDL 25 2018    LABVLDL 33 2016        Ct Abdomen Pelvis W Iv Contrast Additional Contrast? None    Result Date: 2018  Patient MRN:  [id number] : 1960 Age: 66 years Gender: Female EXAM: CT ABDOMEN PELVIS W IV CONTRAST INDICATION:  abdominal pain  COMPARISON: 2017 TECHNIQUE: Low-dose CT  acquisition technique included one of following options: 1 . Automated exposure control 2. Adjustment of MA and or KV according to patient's size or 3. Use of iterative reconstruction. FINDINGS: Lung bases are clear. Mild prominence of the intrahepatic common hepatic and common bile duct dilatation identified. Evidence of previous cholecystectomy. The pancreatic duct also distended measuring 6 mm in greatest dimension. No calcific density identified along the course of the common bile duct. Scattered hepatic cysts throughout the liver are unchanged. [place] Spleen, adrenal glands, kidneys, pancreas are otherwise unremarkable. Question small hiatal hernia. Mild retained stool throughout the colon without bowel obstruction. Mild-to-moderate sigmoid diverticulosis. Urinary bladder unremarkable. Uterus absent. No adnexal mass. No free air or free fluid. Moderate severe degenerative changes lumbar spine L5 S1 and at L1-2. Dilatation of the intrahepatic, extrahepatic ducts, common bile duct and the pancreatic duct.  Please correlate with LFTs and

## 2018-09-14 NOTE — PROGRESS NOTES
Called Dr. Deyanira Reese in regards to ativan needed prior to MRI due to claustrophobia.  Electronically signed by Viral Winston RN on 9/14/2018 at 2:16 PM

## 2018-09-14 NOTE — H&P
History and Physical      CHIEF COMPLAINT: Abdominal pain      History of Present Illness: 22-year-old female patient of Dr. Kevin Sanchez I'm asked to admit and follow. She presented to the ED with 2 days of abdominal pain. Pain has been mostly left upper quadrant worse with eating but also bad with movement. There has been nausea but no emesis. No fever chills. No back pain dysuria. She has a history of recurring pancreatitis; underwent ERCP and papillotomy 2017. Also history of recurring gastritis Livingston's esophagitis. She states she took her medications as directed after the 2017 hospitalization; one the medications  she quit taking them. She is taking dicyclomine daily but no Protonix since 2017. --Gallbladder removed approximately , [facility]  --History of recurring pancreatitis with the above-mentioned papillotomy  as well as removal of 3 stones  --Patient was scheduled for outpatient endoscopic ultrasound, EUS; she states she did have that done 2017 at an outside facility. Results are not in the chart.  --CT done in the ED yesterday reveals dilatation of intrahepatic extrahepatic common bile duct and pancreatic duct. Also probable small hiatal hernia; retained stool throughout colon without obstruction. Mild to moderate sigmoid diverticulosis. --Labs in the ED sodium 139 potassium 4.6 chloride 102 CO2 27 BUN 15 creatinine 1.0 lactic acid 0.7 glucose 89 calcium 10.0 protein 7.3 albumin 4.4. Lipase done in the ; today 24. Hemoglobin in the ED 14.5 white count 9.2 platelets 687. --Today's lab tests sodium 138 potassium 3.9 chloride 105 CO2 23 BUN 14 creatinine 1.0 magnesium 2.4 glucose 83 calcium 8.6 protein 6.0 albumin 3.3 liver functions normal. CRP 0.2 TSH 0.90. Hemoglobin 12.8 white count 7.8 platelets 356.  ProBNP 20 phosphorous 3.3 E44 folic acid normal.  --Patient continues smoking at this time        Past Medical History:   Diagnosis in her mother; Laura Legions in her father; Thyroid Disease in her mother. REVIEW OF SYSTEMS:  As above in the HPI, otherwise negative    PHYSICAL EXAM:    VS: BP (!) 99/55   Pulse 66   Temp 98.4 °F (36.9 °C) (Oral)   Resp 16   Ht 5' 4\" (1.626 m)   Wt 135 lb 12.8 oz (61.6 kg)   SpO2 97%   BMI 23.31 kg/m²     General appearance: Alert, Awake, Oriented times 3, no distress  Skin: Warm and dry ; no rashes  Head: Normocephalic. No masses, lesions or tenderness noted  Eyes: Conjunctivae pink, sclera white. PERRL,EOM-I  Ears: External ears normal  Nose/Sinuses: Nares normal. Septum midline. Mucosa normal. No drainage  Oropharynx: Oropharynx clear with no exudate seen  Neck: Supple. No jugular venous distension, lymphadenopathy or thyromegaly Trachea midline  Lungs: Clear to auscultation bilaterally. No rhonchi, crackles or wheezes  Heart: S1 S2  Regular rate and rhythm. No rub, murmur or gallop  Abdomen: Soft, marked tenderness left upper quadrant; no rebound no guarding bowel sounds normal  Extremities: No edema, Peripheral pulses palpable  Musculoskeletal: Muscular strength appears intact. Neuro:  No focal motor defects ; II-XII grossly intact .  RAMÍREZ equally  Breast: deferred  Rectal: deferred  Genitalia:  deferred    LABS:  CBC:   Lab Results   Component Value Date    WBC 7.8 09/14/2018    RBC 4.00 09/14/2018    HGB 12.8 09/14/2018    HCT 38.2 09/14/2018    MCV 95.5 09/14/2018    MCH 32.0 09/14/2018    MCHC 33.5 09/14/2018    RDW 13.5 09/14/2018     09/14/2018    MPV 10.1 09/14/2018     CBC with Differential:    Lab Results   Component Value Date    WBC 7.8 09/14/2018    RBC 4.00 09/14/2018    HGB 12.8 09/14/2018    HCT 38.2 09/14/2018     09/14/2018    MCV 95.5 09/14/2018    MCH 32.0 09/14/2018    MCHC 33.5 09/14/2018    RDW 13.5 09/14/2018    NRBC 0.0 08/09/2017    SEGSPCT 53 02/24/2014    LYMPHOPCT 52.2 09/14/2018    MONOPCT 5.0 09/14/2018    BASOPCT 0.9 09/14/2018    MONOSABS 0.39 09/14/2018 08/12/2014     U/A:    Lab Results   Component Value Date    COLORU Yellow 09/13/2018    PROTEINU Negative 09/13/2018    PHUR 7.5 09/13/2018    WBCUA NONE 09/13/2018    RBCUA NONE 09/13/2018    RBCUA 0-1 02/24/2014    MUCUS Present 08/13/2014    BACTERIA NONE 09/13/2018    CLARITYU SL CLOUDY 09/13/2018    SPECGRAV 1.010 09/13/2018    LEUKOCYTESUR Negative 09/13/2018    UROBILINOGEN 0.2 09/13/2018    BILIRUBINUR Negative 09/13/2018    BLOODU Negative 09/13/2018    GLUCOSEU Negative 09/13/2018    AMORPHOUS MODERATE 09/13/2018     HgBA1c:    Lab Results   Component Value Date    LABA1C 5.6 09/09/2016     FLP:    Lab Results   Component Value Date    TRIG 123 09/14/2018    HDL 30 09/14/2018    LDLCALC 84 09/14/2018    LABVLDL 25 09/14/2018     TSH:    Lab Results   Component Value Date    TSH 0.900 09/14/2018     VITAMIN B12: No components found for: B12  FOLATE:    Lab Results   Component Value Date    FOLATE 10.4 09/13/2018     AMYLASE:    Lab Results   Component Value Date    AMYLASE 103 09/13/2018     LIPASE:    Lab Results   Component Value Date    LIPASE 24 09/14/2018       RADIOLOGY:  CT ABDOMEN PELVIS W IV CONTRAST Additional Contrast? None   Final Result      Dilatation of the intrahepatic, extrahepatic ducts, common bile duct   and the pancreatic duct. Please correlate with LFTs and consider   further evaluation as clinically warranted. No peripancreatic fluid collections or edema this time on this   examination.       MRI ABDOMEN W WO CONTRAST MRCP    (Results Pending)       ASSESSMENT:      Active Hospital Problems    Diagnosis    Relapsing pancreatitis ([facility] [address] [K86.1]     Priority: High     Class: Acute    Acute recurrent pancreatitis [K85.90]    Chronic relapsing pancreatitis (HCC) [K86.1]    MI (myocardial infarction) ([facility] [address] [I21.9]       PLAN:  Medications discussed with patient  GI prophylaxis  DVT prophylaxis  Consultants notes reviewed  Patient states IV morphine currently giving her a headache;

## 2018-09-15 ENCOUNTER — APPOINTMENT (OUTPATIENT)
Dept: CT IMAGING | Age: 58
DRG: 440 | End: 2018-09-15
Payer: COMMERCIAL

## 2018-09-15 LAB
ALBUMIN SERPL-MCNC: 3.8 G/DL (ref 3.5–5.2)
ALP BLD-CCNC: 65 U/L (ref 35–104)
ALT SERPL-CCNC: 9 U/L (ref 0–32)
ANION GAP SERPL CALCULATED.3IONS-SCNC: 11 MMOL/L (ref 7–16)
AST SERPL-CCNC: 13 U/L (ref 0–31)
BASOPHILS ABSOLUTE: 0.05 E9/L (ref 0–0.2)
BASOPHILS RELATIVE PERCENT: 0.6 % (ref 0–2)
BILIRUB SERPL-MCNC: 0.6 MG/DL (ref 0–1.2)
BUN BLDV-MCNC: 12 MG/DL (ref 6–20)
C-REACTIVE PROTEIN: 0.2 MG/DL (ref 0–0.4)
CALCIUM SERPL-MCNC: 8.8 MG/DL (ref 8.6–10.2)
CHLORIDE BLD-SCNC: 105 MMOL/L (ref 98–107)
CO2: 22 MMOL/L (ref 22–29)
CREAT SERPL-MCNC: 0.8 MG/DL (ref 0.5–1)
EOSINOPHILS ABSOLUTE: 0.2 E9/L (ref 0.05–0.5)
EOSINOPHILS RELATIVE PERCENT: 2.2 % (ref 0–6)
GFR AFRICAN AMERICAN: >60
GFR NON-AFRICAN AMERICAN: >60 ML/MIN/1.73
GLUCOSE BLD-MCNC: 67 MG/DL (ref 74–109)
HCT VFR BLD CALC: 38.5 % (ref 34–48)
HEMOGLOBIN: 12.8 G/DL (ref 11.5–15.5)
IGG: 675 MG/DL (ref 700–1600)
IGM: 106 MG/DL (ref 40–230)
IMMATURE GRANULOCYTES #: 0.04 E9/L
IMMATURE GRANULOCYTES %: 0.4 % (ref 0–5)
LIPASE: 36 U/L (ref 13–60)
LYMPHOCYTES ABSOLUTE: 2.76 E9/L (ref 1.5–4)
LYMPHOCYTES RELATIVE PERCENT: 30.4 % (ref 20–42)
MCH RBC QN AUTO: 31.9 PG (ref 26–35)
MCHC RBC AUTO-ENTMCNC: 33.2 % (ref 32–34.5)
MCV RBC AUTO: 96 FL (ref 80–99.9)
METER GLUCOSE: 109 MG/DL (ref 70–110)
METER GLUCOSE: 68 MG/DL (ref 70–110)
METER GLUCOSE: 71 MG/DL (ref 70–110)
METER GLUCOSE: 82 MG/DL (ref 70–110)
METER GLUCOSE: 95 MG/DL (ref 70–110)
MONOCYTES ABSOLUTE: 0.38 E9/L (ref 0.1–0.95)
MONOCYTES RELATIVE PERCENT: 4.2 % (ref 2–12)
NEUTROPHILS ABSOLUTE: 5.66 E9/L (ref 1.8–7.3)
NEUTROPHILS RELATIVE PERCENT: 62.2 % (ref 43–80)
PDW BLD-RTO: 13.2 FL (ref 11.5–15)
PLATELET # BLD: 196 E9/L (ref 130–450)
PMV BLD AUTO: 10.8 FL (ref 7–12)
POTASSIUM SERPL-SCNC: 4.2 MMOL/L (ref 3.5–5)
RBC # BLD: 4.01 E12/L (ref 3.5–5.5)
SODIUM BLD-SCNC: 138 MMOL/L (ref 132–146)
TOTAL PROTEIN: 6.4 G/DL (ref 6.4–8.3)
WBC # BLD: 9.1 E9/L (ref 4.5–11.5)

## 2018-09-15 PROCEDURE — 6370000000 HC RX 637 (ALT 250 FOR IP): Performed by: INTERNAL MEDICINE

## 2018-09-15 PROCEDURE — 86140 C-REACTIVE PROTEIN: CPT

## 2018-09-15 PROCEDURE — 86255 FLUORESCENT ANTIBODY SCREEN: CPT

## 2018-09-15 PROCEDURE — 05HA33Z INSERTION OF INFUSION DEVICE INTO LEFT BRACHIAL VEIN, PERCUTANEOUS APPROACH: ICD-10-PCS | Performed by: INTERNAL MEDICINE

## 2018-09-15 PROCEDURE — 74178 CT ABD&PLV WO CNTR FLWD CNTR: CPT

## 2018-09-15 PROCEDURE — 2580000003 HC RX 258: Performed by: INTERNAL MEDICINE

## 2018-09-15 PROCEDURE — 82784 ASSAY IGA/IGD/IGG/IGM EACH: CPT

## 2018-09-15 PROCEDURE — 86038 ANTINUCLEAR ANTIBODIES: CPT

## 2018-09-15 PROCEDURE — 2580000003 HC RX 258

## 2018-09-15 PROCEDURE — 6360000004 HC RX CONTRAST MEDICATION: Performed by: RADIOLOGY

## 2018-09-15 PROCEDURE — 6360000002 HC RX W HCPCS: Performed by: INTERNAL MEDICINE

## 2018-09-15 PROCEDURE — B54NZZA ULTRASONOGRAPHY OF LEFT UPPER EXTREMITY VEINS, GUIDANCE: ICD-10-PCS | Performed by: INTERNAL MEDICINE

## 2018-09-15 PROCEDURE — 1200000000 HC SEMI PRIVATE

## 2018-09-15 PROCEDURE — 85025 COMPLETE CBC W/AUTO DIFF WBC: CPT

## 2018-09-15 PROCEDURE — 36415 COLL VENOUS BLD VENIPUNCTURE: CPT

## 2018-09-15 PROCEDURE — 82787 IGG 1 2 3 OR 4 EACH: CPT

## 2018-09-15 PROCEDURE — 80053 COMPREHEN METABOLIC PANEL: CPT

## 2018-09-15 PROCEDURE — 82103 ALPHA-1-ANTITRYPSIN TOTAL: CPT

## 2018-09-15 PROCEDURE — 83690 ASSAY OF LIPASE: CPT

## 2018-09-15 PROCEDURE — 86301 IMMUNOASSAY TUMOR CA 19-9: CPT

## 2018-09-15 PROCEDURE — 82962 GLUCOSE BLOOD TEST: CPT

## 2018-09-15 RX ORDER — SODIUM CHLORIDE 0.9 % (FLUSH) 0.9 %
SYRINGE (ML) INJECTION
Status: COMPLETED
Start: 2018-09-15 | End: 2018-09-15

## 2018-09-15 RX ORDER — METOCLOPRAMIDE HYDROCHLORIDE 5 MG/ML
10 INJECTION INTRAMUSCULAR; INTRAVENOUS EVERY 4 HOURS PRN
Status: DISCONTINUED | OUTPATIENT
Start: 2018-09-15 | End: 2018-09-19 | Stop reason: HOSPADM

## 2018-09-15 RX ORDER — SODIUM CHLORIDE 0.9 % (FLUSH) 0.9 %
10 SYRINGE (ML) INJECTION 2 TIMES DAILY
Status: DISCONTINUED | OUTPATIENT
Start: 2018-09-15 | End: 2018-09-19 | Stop reason: HOSPADM

## 2018-09-15 RX ORDER — SODIUM CHLORIDE 0.9 % (FLUSH) 0.9 %
10 SYRINGE (ML) INJECTION PRN
Status: DISCONTINUED | OUTPATIENT
Start: 2018-09-15 | End: 2018-09-19 | Stop reason: HOSPADM

## 2018-09-15 RX ADMIN — MORPHINE SULFATE 2 MG: 2 INJECTION, SOLUTION INTRAMUSCULAR; INTRAVENOUS at 02:22

## 2018-09-15 RX ADMIN — MORPHINE SULFATE 2 MG: 2 INJECTION, SOLUTION INTRAMUSCULAR; INTRAVENOUS at 15:31

## 2018-09-15 RX ADMIN — MORPHINE SULFATE 2 MG: 2 INJECTION, SOLUTION INTRAMUSCULAR; INTRAVENOUS at 18:02

## 2018-09-15 RX ADMIN — SUCRALFATE 1 G: 1 TABLET ORAL at 11:55

## 2018-09-15 RX ADMIN — SODIUM CHLORIDE: 9 INJECTION, SOLUTION INTRAVENOUS at 09:33

## 2018-09-15 RX ADMIN — MORPHINE SULFATE 2 MG: 2 INJECTION, SOLUTION INTRAMUSCULAR; INTRAVENOUS at 22:20

## 2018-09-15 RX ADMIN — SUCRALFATE 1 G: 1 TABLET ORAL at 07:06

## 2018-09-15 RX ADMIN — PANTOPRAZOLE SODIUM 40 MG: 40 TABLET, DELAYED RELEASE ORAL at 07:06

## 2018-09-15 RX ADMIN — ENOXAPARIN SODIUM 40 MG: 40 INJECTION SUBCUTANEOUS at 08:09

## 2018-09-15 RX ADMIN — MORPHINE SULFATE 2 MG: 2 INJECTION, SOLUTION INTRAMUSCULAR; INTRAVENOUS at 10:09

## 2018-09-15 RX ADMIN — IOPAMIDOL 100 ML: 755 INJECTION, SOLUTION INTRAVENOUS at 20:37

## 2018-09-15 RX ADMIN — METOCLOPRAMIDE 10 MG: 5 INJECTION, SOLUTION INTRAMUSCULAR; INTRAVENOUS at 22:19

## 2018-09-15 RX ADMIN — ONDANSETRON HYDROCHLORIDE 4 MG: 2 INJECTION, SOLUTION INTRAMUSCULAR; INTRAVENOUS at 15:06

## 2018-09-15 RX ADMIN — DICYCLOMINE HYDROCHLORIDE 20 MG: 10 CAPSULE ORAL at 15:06

## 2018-09-15 RX ADMIN — Medication 10 ML: at 20:35

## 2018-09-15 RX ADMIN — ONDANSETRON HYDROCHLORIDE 4 MG: 2 INJECTION, SOLUTION INTRAMUSCULAR; INTRAVENOUS at 08:09

## 2018-09-15 RX ADMIN — Medication 10 ML: at 16:47

## 2018-09-15 RX ADMIN — MORPHINE SULFATE 2 MG: 2 INJECTION, SOLUTION INTRAMUSCULAR; INTRAVENOUS at 08:09

## 2018-09-15 RX ADMIN — MORPHINE SULFATE 2 MG: 2 INJECTION, SOLUTION INTRAMUSCULAR; INTRAVENOUS at 13:18

## 2018-09-15 RX ADMIN — SODIUM CHLORIDE: 9 INJECTION, SOLUTION INTRAVENOUS at 20:20

## 2018-09-15 RX ADMIN — METOCLOPRAMIDE 10 MG: 5 INJECTION, SOLUTION INTRAMUSCULAR; INTRAVENOUS at 18:02

## 2018-09-15 RX ADMIN — Medication 10 ML: at 02:22

## 2018-09-15 ASSESSMENT — PAIN SCALES - GENERAL
PAINLEVEL_OUTOF10: 6
PAINLEVEL_OUTOF10: 8
PAINLEVEL_OUTOF10: 7
PAINLEVEL_OUTOF10: 8
PAINLEVEL_OUTOF10: 4
PAINLEVEL_OUTOF10: 0
PAINLEVEL_OUTOF10: 7
PAINLEVEL_OUTOF10: 2
PAINLEVEL_OUTOF10: 7

## 2018-09-15 ASSESSMENT — PAIN DESCRIPTION - PAIN TYPE
TYPE: ACUTE PAIN

## 2018-09-15 ASSESSMENT — PAIN DESCRIPTION - LOCATION
LOCATION: ABDOMEN

## 2018-09-15 ASSESSMENT — PAIN DESCRIPTION - ONSET
ONSET: ON-GOING
ONSET: ON-GOING

## 2018-09-15 ASSESSMENT — PAIN DESCRIPTION - FREQUENCY
FREQUENCY: INTERMITTENT
FREQUENCY: INTERMITTENT
FREQUENCY: CONTINUOUS
FREQUENCY: CONTINUOUS

## 2018-09-15 ASSESSMENT — PAIN DESCRIPTION - PROGRESSION
CLINICAL_PROGRESSION: NOT CHANGED
CLINICAL_PROGRESSION: NOT CHANGED

## 2018-09-15 ASSESSMENT — PAIN DESCRIPTION - DESCRIPTORS
DESCRIPTORS: SQUEEZING

## 2018-09-15 ASSESSMENT — PAIN DESCRIPTION - ORIENTATION
ORIENTATION: LEFT;UPPER
ORIENTATION: LEFT;UPPER

## 2018-09-15 NOTE — PLAN OF CARE
Problem: Pain:  Goal: Control of acute pain  Control of acute pain   Outcome: Ongoing      Problem: Nausea/Vomiting:  Goal: Absence of nausea/vomiting  Absence of nausea/vomiting   Outcome: Ongoing

## 2018-09-15 NOTE — PROGRESS NOTES
PROGRESS  NOTE --                                                          INTERNAL  MEDICINE                                                                              I  PERSONALLY SAW , EXAMINED, AND CARED [address] [place] [address], 9/15/2018     LABS, XRAY ,CHART, AND MEDICATIONS  REVIEWED BY ME .        SUBJECTIVE: Milton Arenas is alert awake and cooperative; oriented ×3. Denies any chest pain dyspnea . Continues to have chronic nausea despite Zofran and Bentyl. MRCP performed yesterday, reviewed with patient and . Patient has been seen by GI service; now scheduled for a triphasic CT of abdomen. Patient feels this is purely pancreatic pain and frustrated because of it. She does not think his peptic ulcer pain. States she slept off and on during the night. ROS:  Negative to a 10 system review except that mentioned in the HPI. Objective:     PHYSICAL EXAM:    VS: BP (!) 120/58   Pulse 65   Temp 97.8 °F (36.6 °C) (Oral)   Resp 16   Ht 5' 4\" (1.626 m)   Wt 139 lb (63 kg)   SpO2 98%   BMI 23.86 kg/m²   General appearance: Alert, Awake, Oriented times 3, Uncomfortable due to nausea and abdominal pain. Skin: Warm and dry ; no rashes  Head: Normocephalic. No masses, lesions or tenderness noted  Eyes: Conjunctivae pink, sclera white. PERRL,EOM-I  Ears: External ears normal  Nose/Sinuses: Nares normal. Septum midline. Mucosa normal. No drainage  Oropharynx: Oropharynx clear with no exudate seen  Neck: Supple. No jugular venous distension, lymphadenopathy or thyromegaly Trachea midline  Lungs: Clear to auscultation bilaterally. No rhonchi, crackles or wheezes  Heart: S1 S2  Regular rate and rhythm. No rub, murmur or gallop  Abdomen: Soft, marked tenderness across the entire upper abdomen. Bowel sounds decreased.   Extremities: No edema, Peripheral pulses palpable  Musculoskeletal: Muscular strength appears Medications   Medication Dose Route Frequency Provider Last Rate Last Dose    metoclopramide (REGLAN) injection 10 mg  10 mg Intravenous Q4H PRN Zeynep Pore Mihok, DO        pantoprazole (PROTONIX) tablet 40 mg  40 mg Oral BID AC Tim Ortegak, DO   40 mg at 09/15/18 0706    sucralfate (CARAFATE) tablet 1 g  1 g Oral 4 times per day Zeynep Pore Mihok, DO   1 g at 09/15/18 1155    dicyclomine (BENTYL) capsule 20 mg  20 mg Oral TID PRN Zeynep Pore Mihok, DO   20 mg at 09/15/18 1506    nicotine (NICODERM CQ) 21 MG/24HR 1 patch  1 patch Transdermal Daily Tim Mendez DO   1 patch at 09/15/18 0811    ondansetron (ZOFRAN-ODT) disintegrating tablet 4 mg  4 mg Oral Q8H PRN Zeynep Pore Mihok, DO        0.9 % sodium chloride infusion   Intravenous Continuous Zeynep Pore Mihok,  mL/hr at 09/15/18 0933      enoxaparin (LOVENOX) injection 40 mg  40 mg Subcutaneous Daily Tim Mendez, DO   40 mg at 09/15/18 0809    insulin lispro (HUMALOG) injection vial 0-6 Units  0-6 Units Subcutaneous TID WC Tim Mendez DO        insulin lispro (HUMALOG) injection vial 0-3 Units  0-3 Units Subcutaneous Nightly Tim Mendez, DO        glucose (GLUTOSE) 40 % oral gel 15 g  15 g Oral PRN Zeynep Pore Mihok, DO        dextrose 50 % solution 12.5 g  12.5 g Intravenous PRN Zeynep Pore Mihok, DO        glucagon (rDNA) injection 1 mg  1 mg Intramuscular PRN Zeynep Pore Mihok, DO        dextrose 5 % solution  100 mL/hr Intravenous PRN Zeynep Pore Mihok, DO        ondansetron (ZOFRAN) injection 4 mg  4 mg Intravenous Q6H PRN Zeynep Pore Mihok, DO   4 mg at 09/15/18 1506    morphine (PF) injection 2 mg  2 mg Intravenous Q2H PRN Zeynep Pore Mihok, DO   2 mg at 09/15/18 1531     Scheduled Meds:   pantoprazole  40 mg Oral BID AC    sucralfate  1 g Oral 4 times per day    nicotine  1 patch Transdermal Daily    enoxaparin  40 mg Subcutaneous Daily    insulin lispro  0-6 Units Subcutaneous TID WC    insulin lispro  0-3 Units Subcutaneous Nightly Continuous Infusions:   sodium chloride 100 mL/hr at 09/15/18 0933    dextrose           Data:   Temperature:  Current - Temp: 97.8 °F (36.6 °C); Max - Temp  Av.4 °F (36.9 °C)  Min: 97.8 °F (36.6 °C)  Max: 99 °F (37.2 °C)    Respiratory Rate : Resp  Av  Min: 16  Max: 16    Pulse Range: Pulse  Av.5  Min: 64  Max: 65    Blood Presuure Range:  Systolic (35BGL), QEK:320 , Min:114 , OJZ:739   ; Diastolic (37GIN), LLF:97, Min:55, Max:58      Pulse ox Range: SpO2  Av %  Min: 98 %  Max: 98 %    Patient Vitals for the past 8 hrs:   BP Temp Temp src Pulse Resp   09/15/18 0930 (!) 120/58 97.8 °F (36.6 °C) Oral 65 16         Intake/Output Summary (Last 24 hours) at 09/15/18 1606  Last data filed at 09/15/18 1215   Gross per 24 hour   Intake             2605 ml   Output              800 ml   Net             1805 ml       I/O last 3 completed shifts: In: 2605 [P.O.:1020; I.V.:1585]  Out: 800 [Urine:800]    No intake/output data recorded.     Wt Readings from Last 3 Encounters:   09/15/18 139 lb (63 kg)   17 156 lb 3.2 oz (70.9 kg)   17 135 lb (61.2 kg)       Labs:   CBC:   Lab Results   Component Value Date    WBC 9.1 09/15/2018    RBC 4.01 09/15/2018    HGB 12.8 09/15/2018    HCT 38.5 09/15/2018    MCV 96.0 09/15/2018    MCH 31.9 09/15/2018    MCHC 33.2 09/15/2018    RDW 13.2 09/15/2018     09/15/2018    MPV 10.8 09/15/2018     CBC with Differential:    Lab Results   Component Value Date    WBC 9.1 09/15/2018    RBC 4.01 09/15/2018    HGB 12.8 09/15/2018    HCT 38.5 09/15/2018     09/15/2018    MCV 96.0 09/15/2018    MCH 31.9 09/15/2018    MCHC 33.2 09/15/2018    RDW 13.2 09/15/2018    NRBC 0.0 2017    SEGSPCT 53 2014    LYMPHOPCT 30.4 09/15/2018    MONOPCT 4.2 09/15/2018    BASOPCT 0.6 09/15/2018    MONOSABS 0.38 09/15/2018    LYMPHSABS 2.76 09/15/2018    EOSABS 0.20 09/15/2018    BASOSABS 0.05 09/15/2018     Hemoglobin/Hematocrit:    Lab Results   Component Value Date    HGB 12.8 09/15/2018    HCT 38.5 09/15/2018     CMP:    Lab Results   Component Value Date     09/15/2018    K 4.2 09/15/2018     09/15/2018    CO2 22 09/15/2018    BUN 12 09/15/2018    CREATININE 0.8 09/15/2018    GFRAA >60 09/15/2018    LABGLOM >60 09/15/2018    GLUCOSE 67 09/15/2018    GLUCOSE 94 05/20/2012    PROT 6.4 09/15/2018    LABALBU 3.8 09/15/2018    LABALBU 4.3 05/20/2012    CALCIUM 8.8 09/15/2018    BILITOT 0.6 09/15/2018    ALKPHOS 65 09/15/2018    AST 13 09/15/2018    ALT 9 09/15/2018     BMP:    Lab Results   Component Value Date     09/15/2018    K 4.2 09/15/2018     09/15/2018    CO2 22 09/15/2018    BUN 12 09/15/2018    LABALBU 3.8 09/15/2018    LABALBU 4.3 05/20/2012    CREATININE 0.8 09/15/2018    CALCIUM 8.8 09/15/2018    GFRAA >60 09/15/2018    LABGLOM >60 09/15/2018    GLUCOSE 67 09/15/2018    GLUCOSE 94 05/20/2012     Hepatic Function Panel:    Lab Results   Component Value Date    ALKPHOS 65 09/15/2018    ALT 9 09/15/2018    AST 13 09/15/2018    PROT 6.4 09/15/2018    BILITOT 0.6 09/15/2018    BILIDIR 0.2 08/17/2017    IBILI 0.5 08/17/2017    LABALBU 3.8 09/15/2018    LABALBU 4.3 05/20/2012     Magnesium:    Lab Results   Component Value Date    MG 2.4 09/14/2018     Phosphorus:    Lab Results   Component Value Date    PHOS 3.3 09/13/2018     Uric Acid:  No results found for: LABURIC, URICACID  PT/INR:    Lab Results   Component Value Date    PROTIME 11.8 08/15/2017    PROTIME 10.9 11/05/2011    INR 1.1 08/15/2017     Warfarin PT/INR:  No components found for: PTPATWAR, PTINRWAR  PTT:    Lab Results   Component Value Date    APTT 36.3 08/15/2017   [APTT}  Troponin:    Lab Results   Component Value Date    TROPONINI <0.01 08/13/2014     Last 3 Troponin:    Lab Results   Component Value Date    TROPONINI <0.01 08/13/2014    TROPONINI <0.01 08/12/2014    TROPONINI <0.01 08/12/2014     U/A:    Lab Results   Component Value Date    COLORU Yellow 09/13/2018

## 2018-09-15 NOTE — PROGRESS NOTES
Review  CBC:   Lab Results   Component Value Date    WBC 9.1 09/15/2018    RBC 4.01 09/15/2018    HGB 12.8 09/15/2018    HCT 38.5 09/15/2018    MCV 96.0 09/15/2018    MCH 31.9 09/15/2018    MCHC 33.2 09/15/2018    RDW 13.2 09/15/2018     09/15/2018    MPV 10.8 09/15/2018     CMP:    Lab Results   Component Value Date     09/15/2018    K 4.2 09/15/2018     09/15/2018    CO2 22 09/15/2018    BUN 12 09/15/2018    CREATININE 0.8 09/15/2018    GFRAA >60 09/15/2018    LABGLOM >60 09/15/2018    GLUCOSE 67 09/15/2018    GLUCOSE 94 2012    PROT 6.4 09/15/2018    LABALBU 3.8 09/15/2018    LABALBU 4.3 2012    CALCIUM 8.8 09/15/2018    BILITOT 0.6 09/15/2018    ALKPHOS 65 09/15/2018    AST 13 09/15/2018    ALT 9 09/15/2018     Hepatic Function Panel:    Lab Results   Component Value Date    ALKPHOS 65 09/15/2018    ALT 9 09/15/2018    AST 13 09/15/2018    PROT 6.4 09/15/2018    BILITOT 0.6 09/15/2018    BILIDIR 0.2 2017    IBILI 0.5 2017    LABALBU 3.8 09/15/2018    LABALBU 4.3 2012     No components found for: CHLPL    Lab Results   Component Value Date    TRIG 123 2018    TRIG 166 (H) 2016    TRIG 113 2013       Lab Results   Component Value Date    HDL 30 2018    HDL 27 2016    HDL 36.9 (A) 2013       Lab Results   Component Value Date    LDLCALC 84 2018    LDLCALC 138 (H) 2016    LDLCALC 149 (H) 2013       Lab Results   Component Value Date    LABVLDL 25 2018    LABVLDL 33 2016      PT/INR:    Lab Results   Component Value Date    PROTIME 11.8 08/15/2017    PROTIME 10.9 2011    INR 1.1 08/15/2017     Mri Abdomen W Wo Contrast Mrcp    Result Date: 2018  Patient MRN:  [id number] : [id number] Age: 66 years Gender: Female Order Date:  2018 11:15 AM EXAM: MRI ABDOMEN W WO CONTRAST MRCP NUMBER OF IMAGES \ views:  850 INDICATION:  ABNORMAL LIVER FUNCTION TESTS COMPARISON: CT scan 2018 17  Sequences are submitted Multiple sequence of the abdomen with sagittal and coronal MPR reconstructions were obtained from the top of the diaphragm to the bottom of the kidneys. The visualized portions of the abdomen reveal: The liver is unremarkable  there is evidence for biliary dilatation present. . The spleen is unremarkable. . The kidneys are unremarkable  . The adrenals is  unremarkable. The pancreas is abnormal. There is dilatation of the pancreatic duct . There is no significant ascites MRCP is severely limited due to multiple surgical clips adjacent to the common duct causing susceptibility artifact. The common duct appears to be mildly dilated. Findings suggest a double duct sign. No discrete mass is seen at the level of the pancreatic head. No definite enhancing lesion is seen       Assessment:     Principal Problem:    Relapsing pancreatitis [facility])  Active Problems:  ? Diverticulosis  ? Hx PUD, Livingston's esophagus, GERD--EGD 8/14/17 did not reveal Livingston's  ? S/P ERCP with papillotomy 8/16/17 with CBD dilatation and balloon sweeping for removal of 3 small stones; ? Double duct sign referred for EUS (EUS performed 9/11/17 and ws essentially within normal limits)  ? HLD  ? Hx of cholecystectomy  ? Hx of renal cell CA S/P partial nephrectomy in 1991  ? Dilated Pancreatic and Common bile ducts - suggest double duct sign; MRCP is severely limited due to multiple surgical clips adjacent to the common duct causing susceptibility artifact per radiologist report (MRCP) -EUS 9/11/17 negative      Plan:     ? MRCP above noted - Findings suggest a double duct sign per report. EUS negative 9/11/17  ? Continue IV fluids  ? Low fat diet  ? Continue Protonix as ordered  ? Monitor CBC, CMP and Lipase daily  ? Pain management and antiemetics as per PCP  ?  Continue to follow    Discussed with Dr. Judy Hanson per Dr. Jac Lefort [id number], FNP-BC 9/15/2018 11:27 AM For Dr. Maryse Garcia

## 2018-09-16 LAB
ALBUMIN SERPL-MCNC: 3.6 G/DL (ref 3.5–5.2)
ALP BLD-CCNC: 65 U/L (ref 35–104)
ALT SERPL-CCNC: 10 U/L (ref 0–32)
AMYLASE: 71 U/L (ref 20–100)
ANION GAP SERPL CALCULATED.3IONS-SCNC: 10 MMOL/L (ref 7–16)
AST SERPL-CCNC: 14 U/L (ref 0–31)
BASOPHILS ABSOLUTE: 0.05 E9/L (ref 0–0.2)
BASOPHILS RELATIVE PERCENT: 0.7 % (ref 0–2)
BILIRUB SERPL-MCNC: 0.5 MG/DL (ref 0–1.2)
BUN BLDV-MCNC: 8 MG/DL (ref 6–20)
C-REACTIVE PROTEIN: 0.2 MG/DL (ref 0–0.4)
CALCIUM SERPL-MCNC: 8.7 MG/DL (ref 8.6–10.2)
CHLORIDE BLD-SCNC: 105 MMOL/L (ref 98–107)
CO2: 25 MMOL/L (ref 22–29)
CREAT SERPL-MCNC: 0.9 MG/DL (ref 0.5–1)
EOSINOPHILS ABSOLUTE: 0.2 E9/L (ref 0.05–0.5)
EOSINOPHILS RELATIVE PERCENT: 2.6 % (ref 0–6)
GFR AFRICAN AMERICAN: >60
GFR NON-AFRICAN AMERICAN: >60 ML/MIN/1.73
GLUCOSE BLD-MCNC: 75 MG/DL (ref 74–109)
HCT VFR BLD CALC: 38.4 % (ref 34–48)
HEMOGLOBIN: 12.9 G/DL (ref 11.5–15.5)
IMMATURE GRANULOCYTES #: 0.06 E9/L
IMMATURE GRANULOCYTES %: 0.8 % (ref 0–5)
LIPASE: 49 U/L (ref 13–60)
LYMPHOCYTES ABSOLUTE: 2.4 E9/L (ref 1.5–4)
LYMPHOCYTES RELATIVE PERCENT: 31.6 % (ref 20–42)
MCH RBC QN AUTO: 31.8 PG (ref 26–35)
MCHC RBC AUTO-ENTMCNC: 33.6 % (ref 32–34.5)
MCV RBC AUTO: 94.6 FL (ref 80–99.9)
METER GLUCOSE: 117 MG/DL (ref 70–110)
METER GLUCOSE: 71 MG/DL (ref 70–110)
METER GLUCOSE: 72 MG/DL (ref 70–110)
METER GLUCOSE: 91 MG/DL (ref 70–110)
MONOCYTES ABSOLUTE: 0.36 E9/L (ref 0.1–0.95)
MONOCYTES RELATIVE PERCENT: 4.7 % (ref 2–12)
NEUTROPHILS ABSOLUTE: 4.52 E9/L (ref 1.8–7.3)
NEUTROPHILS RELATIVE PERCENT: 59.6 % (ref 43–80)
PDW BLD-RTO: 12.9 FL (ref 11.5–15)
PLATELET # BLD: 205 E9/L (ref 130–450)
PMV BLD AUTO: 10.1 FL (ref 7–12)
POTASSIUM SERPL-SCNC: 3.8 MMOL/L (ref 3.5–5)
RBC # BLD: 4.06 E12/L (ref 3.5–5.5)
SODIUM BLD-SCNC: 140 MMOL/L (ref 132–146)
TOTAL PROTEIN: 6.4 G/DL (ref 6.4–8.3)
WBC # BLD: 7.6 E9/L (ref 4.5–11.5)

## 2018-09-16 PROCEDURE — 6360000002 HC RX W HCPCS: Performed by: INTERNAL MEDICINE

## 2018-09-16 PROCEDURE — 1200000000 HC SEMI PRIVATE

## 2018-09-16 PROCEDURE — 83690 ASSAY OF LIPASE: CPT

## 2018-09-16 PROCEDURE — 6370000000 HC RX 637 (ALT 250 FOR IP): Performed by: CLINICAL NURSE SPECIALIST

## 2018-09-16 PROCEDURE — 36415 COLL VENOUS BLD VENIPUNCTURE: CPT

## 2018-09-16 PROCEDURE — 2580000003 HC RX 258: Performed by: INTERNAL MEDICINE

## 2018-09-16 PROCEDURE — 6370000000 HC RX 637 (ALT 250 FOR IP): Performed by: INTERNAL MEDICINE

## 2018-09-16 PROCEDURE — 85025 COMPLETE CBC W/AUTO DIFF WBC: CPT

## 2018-09-16 PROCEDURE — 82150 ASSAY OF AMYLASE: CPT

## 2018-09-16 PROCEDURE — 80053 COMPREHEN METABOLIC PANEL: CPT

## 2018-09-16 PROCEDURE — 82962 GLUCOSE BLOOD TEST: CPT

## 2018-09-16 PROCEDURE — 86140 C-REACTIVE PROTEIN: CPT

## 2018-09-16 RX ADMIN — SUCRALFATE 1 G: 1 TABLET ORAL at 12:00

## 2018-09-16 RX ADMIN — SODIUM CHLORIDE: 9 INJECTION, SOLUTION INTRAVENOUS at 07:42

## 2018-09-16 RX ADMIN — PANCRELIPASE 3 CAPSULE: 60000; 12000; 38000 CAPSULE, DELAYED RELEASE PELLETS ORAL at 12:00

## 2018-09-16 RX ADMIN — METOCLOPRAMIDE 10 MG: 5 INJECTION, SOLUTION INTRAMUSCULAR; INTRAVENOUS at 19:34

## 2018-09-16 RX ADMIN — MORPHINE SULFATE 2 MG: 2 INJECTION, SOLUTION INTRAMUSCULAR; INTRAVENOUS at 02:17

## 2018-09-16 RX ADMIN — METOCLOPRAMIDE 10 MG: 5 INJECTION, SOLUTION INTRAMUSCULAR; INTRAVENOUS at 09:14

## 2018-09-16 RX ADMIN — SUCRALFATE 1 G: 1 TABLET ORAL at 00:51

## 2018-09-16 RX ADMIN — METOCLOPRAMIDE 10 MG: 5 INJECTION, SOLUTION INTRAMUSCULAR; INTRAVENOUS at 02:17

## 2018-09-16 RX ADMIN — PANCRELIPASE 3 CAPSULE: 60000; 12000; 38000 CAPSULE, DELAYED RELEASE PELLETS ORAL at 16:21

## 2018-09-16 RX ADMIN — METOCLOPRAMIDE 10 MG: 5 INJECTION, SOLUTION INTRAMUSCULAR; INTRAVENOUS at 14:54

## 2018-09-16 RX ADMIN — ENOXAPARIN SODIUM 40 MG: 40 INJECTION SUBCUTANEOUS at 09:08

## 2018-09-16 RX ADMIN — SUCRALFATE 1 G: 1 TABLET ORAL at 17:16

## 2018-09-16 RX ADMIN — PANTOPRAZOLE SODIUM 40 MG: 40 TABLET, DELAYED RELEASE ORAL at 16:21

## 2018-09-16 RX ADMIN — SODIUM CHLORIDE: 9 INJECTION, SOLUTION INTRAVENOUS at 17:41

## 2018-09-16 RX ADMIN — Medication 10 ML: at 19:34

## 2018-09-16 RX ADMIN — MORPHINE SULFATE 2 MG: 2 INJECTION, SOLUTION INTRAMUSCULAR; INTRAVENOUS at 15:00

## 2018-09-16 RX ADMIN — PANTOPRAZOLE SODIUM 40 MG: 40 TABLET, DELAYED RELEASE ORAL at 06:13

## 2018-09-16 RX ADMIN — MORPHINE SULFATE 2 MG: 2 INJECTION, SOLUTION INTRAMUSCULAR; INTRAVENOUS at 06:13

## 2018-09-16 RX ADMIN — MORPHINE SULFATE 2 MG: 2 INJECTION, SOLUTION INTRAMUSCULAR; INTRAVENOUS at 19:40

## 2018-09-16 RX ADMIN — Medication 10 ML: at 09:09

## 2018-09-16 RX ADMIN — SUCRALFATE 1 G: 1 TABLET ORAL at 06:13

## 2018-09-16 RX ADMIN — MORPHINE SULFATE 2 MG: 2 INJECTION, SOLUTION INTRAMUSCULAR; INTRAVENOUS at 09:18

## 2018-09-16 RX ADMIN — MORPHINE SULFATE 2 MG: 2 INJECTION, SOLUTION INTRAMUSCULAR; INTRAVENOUS at 22:24

## 2018-09-16 RX ADMIN — PANCRELIPASE 2 CAPSULE: 60000; 12000; 38000 CAPSULE, DELAYED RELEASE PELLETS ORAL at 20:39

## 2018-09-16 ASSESSMENT — PAIN DESCRIPTION - FREQUENCY
FREQUENCY: CONTINUOUS
FREQUENCY: CONTINUOUS

## 2018-09-16 ASSESSMENT — PAIN DESCRIPTION - ORIENTATION
ORIENTATION: LEFT
ORIENTATION: LEFT;UPPER

## 2018-09-16 ASSESSMENT — PAIN SCALES - GENERAL
PAINLEVEL_OUTOF10: 6
PAINLEVEL_OUTOF10: 7
PAINLEVEL_OUTOF10: 5
PAINLEVEL_OUTOF10: 7
PAINLEVEL_OUTOF10: 2
PAINLEVEL_OUTOF10: 7

## 2018-09-16 ASSESSMENT — PAIN DESCRIPTION - LOCATION
LOCATION: ABDOMEN
LOCATION: ABDOMEN

## 2018-09-16 ASSESSMENT — PAIN DESCRIPTION - ONSET
ONSET: GRADUAL
ONSET: GRADUAL

## 2018-09-16 ASSESSMENT — PAIN DESCRIPTION - PROGRESSION
CLINICAL_PROGRESSION: NOT CHANGED
CLINICAL_PROGRESSION: NOT CHANGED

## 2018-09-16 ASSESSMENT — PAIN DESCRIPTION - DESCRIPTORS
DESCRIPTORS: ACHING;DISCOMFORT
DESCRIPTORS: ACHING;DISCOMFORT;SORE

## 2018-09-16 ASSESSMENT — PAIN DESCRIPTION - PAIN TYPE
TYPE: ACUTE PAIN
TYPE: ACUTE PAIN

## 2018-09-16 NOTE — PROGRESS NOTES
PROGRESS  NOTE --                                                          INTERNAL  MEDICINE                                                                              I  PERSONALLY SAW , EXAMINED, AND CARED [address] [place] [address], 9/16/2018     LABS, XRAY ,CHART, AND MEDICATIONS  REVIEWED BY ME .        9/15/18-SUBJECTIVE: Curtiss Carrel is alert awake and cooperative; oriented ×3. Denies any chest pain dyspnea . Continues to have chronic nausea despite Zofran and Bentyl. MRCP performed yesterday, reviewed with patient and . Patient has been seen by GI service; now scheduled for a triphasic CT of abdomen. Patient feels this is purely pancreatic pain and frustrated because of it. She does not think his peptic ulcer pain. States she slept off and on during the night. 9/16/18-patient laying quietly in bed; became nauseated after trying to drink coffee. She states Reglan did help significantly for the nausea until she tried eating. She did undergo abdominal CT, triphasic last evening; results discussed with her. Still has double duct sign no pancreatic mass identified. I spoke with GI 2 days earlier; they suggested ERCP was likely to be performed. Currently on a low-fat diet. Sodium 140 potassium 3.8 chloride 105 CO2 25 BUN 8 creatinine 0.9 glucose 75 calcium 8.7 protein 6.4 albumin 3.6 liver functions normal. Hemoglobin 12.9 white count 7.6. Lipase 49. ROS:  Negative to a 10 system review except that mentioned in the HPI. Objective:     PHYSICAL EXAM:    VS: /61   Pulse 60   Temp 98.2 °F (36.8 °C) (Oral)   Resp 16   Ht 5' 4\" (1.626 m)   Wt 140 lb (63.5 kg)   SpO2 97%   BMI 24.03 kg/m²   General appearance: Alert, Awake, Oriented times 3, Uncomfortable due to nausea and abdominal pain. Skin: Warm and dry ; no rashes  Head: Normocephalic.  No masses, lesions or tenderness noted  Eyes: Conjunctivae pancreatitis [K85.90]    Chronic relapsing pancreatitis ([facility] [K86.1]    Choledocholithiasis [K80.50]    Livingtson's esophagus [K22.70]    GERD (gastroesophageal reflux disease) [K21.9]    Coronary artery disease involving native coronary artery [I25.10]    MI (myocardial infarction) ([facility] [I21.9]    Cancer ([place] [address].) [C80.1]                 ------------  INFORMATION  -----------      DIET:DIET LOW FAT;         Allergies   Allergen Reactions    Darvocet [Propoxyphene N-Acetaminophen]     Darvon [Propoxyphene Hcl]     Percocet [Oxycodone-Acetaminophen]     Dilaudid [Hydromorphone Hcl] Nausea And Vomiting         MEDICATION SIDE EFFECTS:none       SCHEDULED MEDS:                                 Current Facility-Administered Medications   Medication Dose Route Frequency Provider Last Rate Last Dose    lipase-protease-amylase (CREON) 80219 units delayed release capsule 3 capsule  3 capsule Oral TID  JOSÉ MIGUEL Cruz - CNS   3 capsule at 09/16/18 1200    lipase-protease-amylase (CREON) 65495 units delayed release capsule 2 capsule  2 capsule Oral BID JOSÉ MIGUEL Cruz - CNS        metoclopramide (REGLAN) injection 10 mg  10 mg Intravenous Q4H PRN Valery Mendez, DO   10 mg at 09/16/18 0914    sodium chloride flush 0.9 % injection 10 mL  10 mL Intravenous PRN John Angela, DO   10 mL at 09/15/18 1647    sodium chloride flush 0.9 % injection 10 mL  10 mL Intravenous BID John Angela, DO   10 mL at 09/16/18 0909    pantoprazole (PROTONIX) tablet 40 mg  40 mg Oral BID AC Tim Mendez DO   40 mg at 09/16/18 9744    sucralfate (CARAFATE) tablet 1 g  1 g Oral 4 times per day Valery Mendez, DO   1 g at 09/16/18 1200    dicyclomine (BENTYL) capsule 20 mg  20 mg Oral TID PRN Valery Mendez, DO   20 mg at 09/15/18 1506    nicotine (NICODERM CQ) 21 MG/24HR 1 patch  1 patch Transdermal Daily Tim Mendez DO   1 patch at 09/16/18 0909    ondansetron (ZOFRAN-ODT) disintegrating tablet 4 mg  4 mg Oral Q8H Vitals for the past 8 hrs:   BP Temp Temp src Pulse Resp SpO2   09/16/18 0906 109/61 98.2 °F (36.8 °C) Oral 60 16 97 %         Intake/Output Summary (Last 24 hours) at 09/16/18 1329  Last data filed at 09/16/18 0542   Gross per 24 hour   Intake              240 ml   Output             2000 ml   Net            -1760 ml       I/O last 3 completed shifts: In: 1080 [P.O.:1080]  Out: 2100 [Urine:2100]    No intake/output data recorded.     Wt Readings from Last 3 Encounters:   09/16/18 140 lb (63.5 kg)   08/17/17 156 lb 3.2 oz (70.9 kg)   08/09/17 135 lb (61.2 kg)       Labs:   CBC:   Lab Results   Component Value Date    WBC 7.6 09/16/2018    RBC 4.06 09/16/2018    HGB 12.9 09/16/2018    HCT 38.4 09/16/2018    MCV 94.6 09/16/2018    MCH 31.8 09/16/2018    MCHC 33.6 09/16/2018    RDW 12.9 09/16/2018     09/16/2018    MPV 10.1 09/16/2018     CBC with Differential:    Lab Results   Component Value Date    WBC 7.6 09/16/2018    RBC 4.06 09/16/2018    HGB 12.9 09/16/2018    HCT 38.4 09/16/2018     09/16/2018    MCV 94.6 09/16/2018    MCH 31.8 09/16/2018    MCHC 33.6 09/16/2018    RDW 12.9 09/16/2018    NRBC 0.0 08/09/2017    SEGSPCT 53 02/24/2014    LYMPHOPCT 31.6 09/16/2018    MONOPCT 4.7 09/16/2018    BASOPCT 0.7 09/16/2018    MONOSABS 0.36 09/16/2018    LYMPHSABS 2.40 09/16/2018    EOSABS 0.20 09/16/2018    BASOSABS 0.05 09/16/2018     Hemoglobin/Hematocrit:    Lab Results   Component Value Date    HGB 12.9 09/16/2018    HCT 38.4 09/16/2018     CMP:    Lab Results   Component Value Date     09/16/2018    K 3.8 09/16/2018     09/16/2018    CO2 25 09/16/2018    BUN 8 09/16/2018    CREATININE 0.9 09/16/2018    GFRAA >60 09/16/2018    LABGLOM >60 09/16/2018    GLUCOSE 75 09/16/2018    GLUCOSE 94 05/20/2012    PROT 6.4 09/16/2018    LABALBU 3.6 09/16/2018    LABALBU 4.3 05/20/2012    CALCIUM 8.7 09/16/2018    BILITOT 0.5 09/16/2018    ALKPHOS 65 09/16/2018    AST 14 09/16/2018    ALT 10 09/16/2018 BMP:    Lab Results   Component Value Date     09/16/2018    K 3.8 09/16/2018     09/16/2018    CO2 25 09/16/2018    BUN 8 09/16/2018    LABALBU 3.6 09/16/2018    LABALBU 4.3 05/20/2012    CREATININE 0.9 09/16/2018    CALCIUM 8.7 09/16/2018    GFRAA >60 09/16/2018    LABGLOM >60 09/16/2018    GLUCOSE 75 09/16/2018    GLUCOSE 94 05/20/2012     Hepatic Function Panel:    Lab Results   Component Value Date    ALKPHOS 65 09/16/2018    ALT 10 09/16/2018    AST 14 09/16/2018    PROT 6.4 09/16/2018    BILITOT 0.5 09/16/2018    BILIDIR 0.2 08/17/2017    IBILI 0.5 08/17/2017    LABALBU 3.6 09/16/2018    LABALBU 4.3 05/20/2012     Magnesium:    Lab Results   Component Value Date    MG 2.4 09/14/2018     Phosphorus:    Lab Results   Component Value Date    PHOS 3.3 09/13/2018     Uric Acid:  No results found for: LABURIC, URICACID  PT/INR:    Lab Results   Component Value Date    PROTIME 11.8 08/15/2017    PROTIME 10.9 11/05/2011    INR 1.1 08/15/2017     Warfarin PT/INR:  No components found for: PTPATWAR, PTINRWAR  PTT:    Lab Results   Component Value Date    APTT 36.3 08/15/2017   [APTT}  Troponin:    Lab Results   Component Value Date    TROPONINI <0.01 08/13/2014     Last 3 Troponin:    Lab Results   Component Value Date    TROPONINI <0.01 08/13/2014    TROPONINI <0.01 08/12/2014    TROPONINI <0.01 08/12/2014     U/A:    Lab Results   Component Value Date    COLORU Yellow 09/13/2018    PROTEINU Negative 09/13/2018    PHUR 7.5 09/13/2018    WBCUA NONE 09/13/2018    RBCUA NONE 09/13/2018    RBCUA 0-1 02/24/2014    MUCUS Present 08/13/2014    BACTERIA NONE 09/13/2018    CLARITYU SL CLOUDY 09/13/2018    SPECGRAV 1.010 09/13/2018    LEUKOCYTESUR Negative 09/13/2018    UROBILINOGEN 0.2 09/13/2018    BILIRUBINUR Negative 09/13/2018    BLOODU Negative 09/13/2018    GLUCOSEU Negative 09/13/2018    AMORPHOUS MODERATE 09/13/2018     HgBA1c:    Lab Results   Component Value Date    LABA1C 5.6 09/09/2016     FLP: Lab Results   Component Value Date    TRIG 123 09/14/2018    HDL 30 09/14/2018    LDLCALC 84 09/14/2018    LABVLDL 25 09/14/2018     TSH:    Lab Results   Component Value Date    TSH 0.900 09/14/2018     VITAMIN B12: No components found for: B12  FOLATE:    Lab Results   Component Value Date    FOLATE 10.4 09/13/2018     AMYLASE:    Lab Results   Component Value Date    AMYLASE 71 09/16/2018     LIPASE:    Lab Results   Component Value Date    LIPASE 49 09/16/2018        CBC:  Recent Labs      09/14/18   0350  09/15/18   0843  09/16/18   0715   WBC  7.8  9.1  7.6   RBC  4.00  4.01  4.06   HGB  12.8  12.8  12.9   HCT  38.2  38.5  38.4   PLT  211  196  205   MCV  95.5  96.0  94.6   MCH  32.0  31.9  31.8   MCHC  33.5  33.2  33.6   RDW  13.5  13.2  12.9   LYMPHOPCT  52.2*  30.4  31.6   MONOPCT  5.0  4.2  4.7   BASOPCT  0.9  0.6  0.7   MONOSABS  0.39  0.38  0.36   LYMPHSABS  4.07*  2.76  2.40   EOSABS  0.24  0.20  0.20   BASOSABS  0.07  0.05  0.05          H & H :  Recent Labs      09/13/18   1527  09/14/18   0350  09/15/18   0843  09/16/18   0715   HGB  14.5  12.8  12.8  12.9       TSH:  Recent Labs      09/14/18   0350   TSH  0.900       GLUCOSE:No results for input(s): POCGLU in the last 72 hours. CMP:  Recent Labs      09/14/18   0350  09/15/18   0843  09/16/18   0715   NA  138  138  140   K  3.9  4.2  3.8   CL  105  105  105   CO2  23  22  25   BUN  14  12  8   CREATININE  1.0  0.8  0.9   GFRAA  >60  >60  >60   LABGLOM  57  >60  >60   GLUCOSE  83  67*  75   PROT  6.0*  6.4  6.4   LABALBU  3.3*  3.8  3.6   CALCIUM  8.6  8.8  8.7   BILITOT  0.5  0.6  0.5   ALKPHOS  61  65  65   AST  13  13  14   ALT  10  9  10        BNP:No results found for: BNP    PROTIME/INR:No results for input(s): PROTIME, INR in the last 72 hours. CRP:   Recent Labs      09/14/18   0350  09/15/18   0843   CRP  0.2  0.2       ESR:No results for input(s): SEDRATE in the last 72 hours.     LIPASE , AMYLASE:  Lab Results   Component Value Date    LIPASE 49 09/16/2018      Lab Results   Component Value Date    AMYLASE 71 09/16/2018       ABGs: No results found for: PH, PO2, PCO2    CARDIAC: No results for input(s): CKTOTAL, CKMB, CKMBINDEX, TROPONINI in the last 72 hours. Lipid Profile:   Lab Results   Component Value Date    TRIG 123 09/14/2018    HDL 30 09/14/2018    LDLCALC 84 09/14/2018    CHOL 139 09/14/2018        Lab Results   Component Value Date    LABA1C 5.6 09/09/2016            RADIOLOGY: REVIEWED AVAILABLE REPORT  CT ABDOMEN PELVIS W WO CONTRAST Additional Contrast? None   Final Result   Findings remain compatible with a double duct sign, no pancreatic head   mass identified                                        MRI ABDOMEN W WO CONTRAST MRCP   Final Result   Findings suggest a double duct sign. No discrete mass is   seen at the level of the pancreatic head. No definite enhancing lesion   is seen                CT ABDOMEN PELVIS W IV CONTRAST Additional Contrast? None   Final Result      Dilatation of the intrahepatic, extrahepatic ducts, common bile duct   and the pancreatic duct. Please correlate with LFTs and consider   further evaluation as clinically warranted. No peripancreatic fluid collections or edema this time on this   examination.             Active Hospital Problems    Diagnosis    Relapsing pancreatitis ([facility] [K86.1]     Priority: High     Class: Acute    Gastric ulcer [K25.9]     Priority: High     Class: Chronic    Acute recurrent pancreatitis [K85.90]    Chronic relapsing pancreatitis ([facility] [K86.1]    Choledocholithiasis [K80.50]    Livingston's esophagus [K22.70]    GERD (gastroesophageal reflux disease) [K21.9]    Coronary artery disease involving native coronary artery [I25.10]    MI (myocardial infarction) ([facility] [I21.9]    Cancer ([facility] [C80.1]         Petr Mendez  DO   1:29 PM     9/16/2018

## 2018-09-16 NOTE — PROGRESS NOTES
lispro (HUMALOG) injection vial 0-3 Units Nightly   glucose (GLUTOSE) 40 % oral gel 15 g PRN   dextrose 50 % solution 12.5 g PRN   glucagon (rDNA) injection 1 mg PRN   dextrose 5 % solution PRN   ondansetron (ZOFRAN) injection 4 mg Q6H PRN   morphine (PF) injection 2 mg Q2H PRN        Data Review  CBC:   Lab Results   Component Value Date    WBC 7.6 09/16/2018    RBC 4.06 09/16/2018    HGB 12.9 09/16/2018    HCT 38.4 09/16/2018    MCV 94.6 09/16/2018    MCH 31.8 09/16/2018    MCHC 33.6 09/16/2018    RDW 12.9 09/16/2018     09/16/2018    MPV 10.1 09/16/2018     CMP:    Lab Results   Component Value Date     09/16/2018    K 3.8 09/16/2018     09/16/2018    CO2 25 09/16/2018    BUN 8 09/16/2018    CREATININE 0.9 09/16/2018    GFRAA >60 09/16/2018    LABGLOM >60 09/16/2018    GLUCOSE 75 09/16/2018    GLUCOSE 94 05/20/2012    PROT 6.4 09/16/2018    LABALBU 3.6 09/16/2018    LABALBU 4.3 05/20/2012    CALCIUM 8.7 09/16/2018    BILITOT 0.5 09/16/2018    ALKPHOS 65 09/16/2018    AST 14 09/16/2018    ALT 10 09/16/2018     Hepatic Function Panel:    Lab Results   Component Value Date    ALKPHOS 65 09/16/2018    ALT 10 09/16/2018    AST 14 09/16/2018    PROT 6.4 09/16/2018    BILITOT 0.5 09/16/2018    BILIDIR 0.2 08/17/2017    IBILI 0.5 08/17/2017    LABALBU 3.6 09/16/2018    LABALBU 4.3 05/20/2012     No components found for: CHLPL    Lab Results   Component Value Date    TRIG 123 09/14/2018    TRIG 166 (H) 09/09/2016    TRIG 113 01/16/2013       Lab Results   Component Value Date    HDL 30 09/14/2018    HDL 27 09/09/2016    HDL 36.9 (A) 01/16/2013       Lab Results   Component Value Date    LDLCALC 84 09/14/2018    LDLCALC 138 (H) 09/09/2016    LDLCALC 149 (H) 01/16/2013       Lab Results   Component Value Date    LABVLDL 25 09/14/2018    LABVLDL 33 09/09/2016      PT/INR:    Lab Results   Component Value Date    PROTIME 11.8 08/15/2017    PROTIME 10.9 11/05/2011    INR 1.1 08/15/2017     Mri Abdomen W Wo Contrast Mrcp    Result Date: 2018  Patient MRN:  [id number] : 1960 Age: 66 years Gender: Female Order Date:  2018 11:15 AM EXAM: MRI ABDOMEN W WO CONTRAST MRCP NUMBER OF IMAGES \ views:  850 INDICATION:  ABNORMAL LIVER FUNCTION TESTS COMPARISON: CT scan 2018 17  Sequences are submitted Multiple sequence of the abdomen with sagittal and coronal MPR reconstructions were obtained from the top of the diaphragm to the bottom of the kidneys. The visualized portions of the abdomen reveal: The liver is unremarkable  there is evidence for biliary dilatation present. . The spleen is unremarkable. . The kidneys are unremarkable  . The adrenals is  unremarkable. The pancreas is abnormal. There is dilatation of the pancreatic duct . There is no significant ascites MRCP is severely limited due to multiple surgical clips adjacent to the common duct causing susceptibility artifact. The common duct appears to be mildly dilated. Findings suggest a double duct sign. No discrete mass is seen at the level of the pancreatic head. No definite enhancing lesion is seen       Assessment:     Principal Problem:    Relapsing pancreatitis [facility])  Active Problems:  ? Diverticulosis  ? Hx PUD, Livingston's esophagus, GERD--EGD 17 did not reveal Livingston's  ? S/P ERCP with papillotomy 17 with CBD dilatation and balloon sweeping for removal of 3 small stones; ? Double duct sign referred for EUS (EUS performed 17 and ws essentially within normal limits)  ? HLD  ? Hx of cholecystectomy  ? Hx of renal cell CA S/P partial nephrectomy in   ? Dilated Pancreatic and Common bile ducts - suggest double duct sign; MRCP is severely limited due to multiple surgical clips adjacent to the common duct causing susceptibility artifact per radiologist report (MRCP) -EUS 17 negative      Plan:     ? MRCP above noted - Findings suggest a double duct sign per report. EUS negative 17  ? Triphasic CT noted  ?  Low fat

## 2018-09-17 LAB
ALBUMIN SERPL-MCNC: 3.2 G/DL (ref 3.5–5.2)
ALP BLD-CCNC: 59 U/L (ref 35–104)
ALT SERPL-CCNC: 10 U/L (ref 0–32)
ANION GAP SERPL CALCULATED.3IONS-SCNC: 8 MMOL/L (ref 7–16)
ANTI-MITOCHONDRIAL AB, IFA: NEGATIVE
ANTI-NUCLEAR ANTIBODY (ANA): NEGATIVE
AST SERPL-CCNC: 16 U/L (ref 0–31)
BASOPHILS ABSOLUTE: 0.02 E9/L (ref 0–0.2)
BASOPHILS RELATIVE PERCENT: 0.3 % (ref 0–2)
BILIRUB SERPL-MCNC: 0.4 MG/DL (ref 0–1.2)
BUN BLDV-MCNC: 8 MG/DL (ref 6–20)
C-REACTIVE PROTEIN: 0.3 MG/DL (ref 0–0.4)
CALCIUM SERPL-MCNC: 8.3 MG/DL (ref 8.6–10.2)
CHLORIDE BLD-SCNC: 107 MMOL/L (ref 98–107)
CO2: 24 MMOL/L (ref 22–29)
CREAT SERPL-MCNC: 0.9 MG/DL (ref 0.5–1)
EOSINOPHILS ABSOLUTE: 0.1 E9/L (ref 0.05–0.5)
EOSINOPHILS RELATIVE PERCENT: 1.6 % (ref 0–6)
GFR AFRICAN AMERICAN: >60
GFR NON-AFRICAN AMERICAN: >60 ML/MIN/1.73
GLUCOSE BLD-MCNC: 88 MG/DL (ref 74–109)
HCT VFR BLD CALC: 36.5 % (ref 34–48)
HEMOGLOBIN: 12.4 G/DL (ref 11.5–15.5)
IMMATURE GRANULOCYTES #: 0.02 E9/L
IMMATURE GRANULOCYTES %: 0.3 % (ref 0–5)
LIPASE: 19 U/L (ref 13–60)
LYMPHOCYTES ABSOLUTE: 1.41 E9/L (ref 1.5–4)
LYMPHOCYTES RELATIVE PERCENT: 22.1 % (ref 20–42)
MCH RBC QN AUTO: 31.7 PG (ref 26–35)
MCHC RBC AUTO-ENTMCNC: 34 % (ref 32–34.5)
MCV RBC AUTO: 93.4 FL (ref 80–99.9)
METER GLUCOSE: 138 MG/DL (ref 70–110)
METER GLUCOSE: 66 MG/DL (ref 70–110)
METER GLUCOSE: 72 MG/DL (ref 70–110)
METER GLUCOSE: 93 MG/DL (ref 70–110)
METER GLUCOSE: 96 MG/DL (ref 70–110)
MONOCYTES ABSOLUTE: 0.24 E9/L (ref 0.1–0.95)
MONOCYTES RELATIVE PERCENT: 3.8 % (ref 2–12)
NEUTROPHILS ABSOLUTE: 4.6 E9/L (ref 1.8–7.3)
NEUTROPHILS RELATIVE PERCENT: 71.9 % (ref 43–80)
PDW BLD-RTO: 13 FL (ref 11.5–15)
PLATELET # BLD: 191 E9/L (ref 130–450)
PMV BLD AUTO: 10.6 FL (ref 7–12)
POTASSIUM SERPL-SCNC: 4.2 MMOL/L (ref 3.5–5)
RBC # BLD: 3.91 E12/L (ref 3.5–5.5)
SMOOTH MUSCLE ANTIBODY: NEGATIVE
SODIUM BLD-SCNC: 139 MMOL/L (ref 132–146)
TOTAL PROTEIN: 5.7 G/DL (ref 6.4–8.3)
WBC # BLD: 6.4 E9/L (ref 4.5–11.5)

## 2018-09-17 PROCEDURE — 2580000003 HC RX 258: Performed by: INTERNAL MEDICINE

## 2018-09-17 PROCEDURE — 6370000000 HC RX 637 (ALT 250 FOR IP): Performed by: INTERNAL MEDICINE

## 2018-09-17 PROCEDURE — 86140 C-REACTIVE PROTEIN: CPT

## 2018-09-17 PROCEDURE — 80053 COMPREHEN METABOLIC PANEL: CPT

## 2018-09-17 PROCEDURE — 6370000000 HC RX 637 (ALT 250 FOR IP): Performed by: CLINICAL NURSE SPECIALIST

## 2018-09-17 PROCEDURE — 36415 COLL VENOUS BLD VENIPUNCTURE: CPT

## 2018-09-17 PROCEDURE — 85025 COMPLETE CBC W/AUTO DIFF WBC: CPT

## 2018-09-17 PROCEDURE — 1200000000 HC SEMI PRIVATE

## 2018-09-17 PROCEDURE — 6360000002 HC RX W HCPCS: Performed by: INTERNAL MEDICINE

## 2018-09-17 PROCEDURE — 99223 1ST HOSP IP/OBS HIGH 75: CPT | Performed by: TRANSPLANT SURGERY

## 2018-09-17 PROCEDURE — 83690 ASSAY OF LIPASE: CPT

## 2018-09-17 PROCEDURE — 82962 GLUCOSE BLOOD TEST: CPT

## 2018-09-17 RX ADMIN — SUCRALFATE 1 G: 1 TABLET ORAL at 17:24

## 2018-09-17 RX ADMIN — MORPHINE SULFATE 2 MG: 2 INJECTION, SOLUTION INTRAMUSCULAR; INTRAVENOUS at 22:02

## 2018-09-17 RX ADMIN — METOCLOPRAMIDE 10 MG: 5 INJECTION, SOLUTION INTRAMUSCULAR; INTRAVENOUS at 00:45

## 2018-09-17 RX ADMIN — METOCLOPRAMIDE 10 MG: 5 INJECTION, SOLUTION INTRAMUSCULAR; INTRAVENOUS at 06:19

## 2018-09-17 RX ADMIN — PANCRELIPASE 3 CAPSULE: 60000; 12000; 38000 CAPSULE, DELAYED RELEASE PELLETS ORAL at 17:23

## 2018-09-17 RX ADMIN — MORPHINE SULFATE 2 MG: 2 INJECTION, SOLUTION INTRAMUSCULAR; INTRAVENOUS at 06:19

## 2018-09-17 RX ADMIN — METOCLOPRAMIDE 10 MG: 5 INJECTION, SOLUTION INTRAMUSCULAR; INTRAVENOUS at 22:02

## 2018-09-17 RX ADMIN — MORPHINE SULFATE 2 MG: 2 INJECTION, SOLUTION INTRAMUSCULAR; INTRAVENOUS at 19:45

## 2018-09-17 RX ADMIN — SODIUM CHLORIDE: 9 INJECTION, SOLUTION INTRAVENOUS at 22:04

## 2018-09-17 RX ADMIN — SUCRALFATE 1 G: 1 TABLET ORAL at 06:19

## 2018-09-17 RX ADMIN — MORPHINE SULFATE 2 MG: 2 INJECTION, SOLUTION INTRAMUSCULAR; INTRAVENOUS at 08:21

## 2018-09-17 RX ADMIN — Medication 10 ML: at 08:21

## 2018-09-17 RX ADMIN — METOCLOPRAMIDE 10 MG: 5 INJECTION, SOLUTION INTRAMUSCULAR; INTRAVENOUS at 16:22

## 2018-09-17 RX ADMIN — PANTOPRAZOLE SODIUM 40 MG: 40 TABLET, DELAYED RELEASE ORAL at 16:22

## 2018-09-17 RX ADMIN — PANTOPRAZOLE SODIUM 40 MG: 40 TABLET, DELAYED RELEASE ORAL at 06:19

## 2018-09-17 RX ADMIN — MAGNESIUM HYDROXIDE 30 ML: 400 SUSPENSION ORAL at 11:52

## 2018-09-17 RX ADMIN — SODIUM CHLORIDE: 9 INJECTION, SOLUTION INTRAVENOUS at 12:24

## 2018-09-17 RX ADMIN — Medication 10 ML: at 19:50

## 2018-09-17 RX ADMIN — ENOXAPARIN SODIUM 40 MG: 40 INJECTION SUBCUTANEOUS at 08:21

## 2018-09-17 RX ADMIN — SUCRALFATE 1 G: 1 TABLET ORAL at 00:45

## 2018-09-17 RX ADMIN — MORPHINE SULFATE 2 MG: 2 INJECTION, SOLUTION INTRAMUSCULAR; INTRAVENOUS at 11:51

## 2018-09-17 RX ADMIN — MORPHINE SULFATE 2 MG: 2 INJECTION, SOLUTION INTRAMUSCULAR; INTRAVENOUS at 00:46

## 2018-09-17 RX ADMIN — METOCLOPRAMIDE 10 MG: 5 INJECTION, SOLUTION INTRAMUSCULAR; INTRAVENOUS at 11:51

## 2018-09-17 RX ADMIN — MORPHINE SULFATE 2 MG: 2 INJECTION, SOLUTION INTRAMUSCULAR; INTRAVENOUS at 14:45

## 2018-09-17 RX ADMIN — SUCRALFATE 1 G: 1 TABLET ORAL at 11:52

## 2018-09-17 ASSESSMENT — PAIN SCALES - GENERAL
PAINLEVEL_OUTOF10: 6
PAINLEVEL_OUTOF10: 6
PAINLEVEL_OUTOF10: 7
PAINLEVEL_OUTOF10: 6
PAINLEVEL_OUTOF10: 7
PAINLEVEL_OUTOF10: 3
PAINLEVEL_OUTOF10: 7
PAINLEVEL_OUTOF10: 3
PAINLEVEL_OUTOF10: 7

## 2018-09-17 ASSESSMENT — ENCOUNTER SYMPTOMS
BLOOD IN STOOL: 0
NAUSEA: 0
EYE PAIN: 0
SPUTUM PRODUCTION: 0
ABDOMINAL PAIN: 1
DOUBLE VISION: 0
DIARRHEA: 0
ORTHOPNEA: 0
PHOTOPHOBIA: 0
VOMITING: 0
HEMOPTYSIS: 0
HEARTBURN: 0
CONSTIPATION: 0
SHORTNESS OF BREATH: 0
EYE DISCHARGE: 0
BACK PAIN: 1

## 2018-09-17 ASSESSMENT — PAIN DESCRIPTION - PROGRESSION
CLINICAL_PROGRESSION: NOT CHANGED
CLINICAL_PROGRESSION: NOT CHANGED

## 2018-09-17 ASSESSMENT — PAIN DESCRIPTION - LOCATION: LOCATION: ABDOMEN

## 2018-09-17 ASSESSMENT — PAIN DESCRIPTION - ONSET: ONSET: ON-GOING

## 2018-09-17 ASSESSMENT — PAIN DESCRIPTION - DESCRIPTORS: DESCRIPTORS: TENDER;SQUEEZING

## 2018-09-17 ASSESSMENT — PAIN DESCRIPTION - FREQUENCY: FREQUENCY: CONTINUOUS

## 2018-09-17 ASSESSMENT — PAIN DESCRIPTION - PAIN TYPE: TYPE: ACUTE PAIN

## 2018-09-17 ASSESSMENT — PAIN DESCRIPTION - ORIENTATION: ORIENTATION: LEFT;UPPER

## 2018-09-17 NOTE — PROGRESS NOTES
LABVLDL 25 2018    LABVLDL 33 2016      PT/INR:    Lab Results   Component Value Date    PROTIME 11.8 08/15/2017    PROTIME 10.9 2011    INR 1.1 08/15/2017     Mri Abdomen W Wo Contrast Mrcp    Result Date: 2018  Patient MRN:  [id number] : 1960 Age: 66 years Gender: Female Order Date:  2018 11:15 AM EXAM: MRI ABDOMEN W WO CONTRAST MRCP NUMBER OF IMAGES \ views:  850 INDICATION:  ABNORMAL LIVER FUNCTION TESTS COMPARISON: CT scan 2018 17  Sequences are submitted Multiple sequence of the abdomen with sagittal and coronal MPR reconstructions were obtained from the top of the diaphragm to the bottom of the kidneys. The visualized portions of the abdomen reveal: The liver is unremarkable  there is evidence for biliary dilatation present. . The spleen is unremarkable. . The kidneys are unremarkable  . The adrenals is  unremarkable. The pancreas is abnormal. There is dilatation of the pancreatic duct . There is no significant ascites MRCP is severely limited due to multiple surgical clips adjacent to the common duct causing susceptibility artifact. The common duct appears to be mildly dilated. Findings suggest a double duct sign. No discrete mass is seen at the level of the pancreatic head. No definite enhancing lesion is seen       Assessment:     Principal Problem:    Relapsing pancreatitis [facility])  Active Problems:  ? Diverticulosis  ? Hx PUD, Livingston's esophagus, GERD--EGD 17 did not reveal Livingston's  ? S/P ERCP with papillotomy 17 with CBD dilatation and balloon sweeping for removal of 3 small stones; ? Double duct sign referred for EUS (EUS performed 17 and ws essentially within normal limits)  ? HLD  ? Hx of cholecystectomy  ? Hx of renal cell CA S/P partial nephrectomy in   ?  Dilated Pancreatic and Common bile ducts - suggest double duct sign; MRCP is severely limited due to multiple surgical clips adjacent to the common duct causing susceptibility artifact per radiologist report (MRCP) -EUS 9/11/17 negative      Plan:     ? MRCP above noted - Findings suggest a double duct sign per report. EUS negative 9/11/17  ? Triphasic CT noted - also indicating double duct sign - Dr Keyona Barragan consulted. Pt will need repeat EUS per Dr Faina Arrington  ? MOM as ordered  ? Low fat diet  ? Creon as ordered  ? Continue Protonix as ordered  ? Monitor CBC, CMP and Lipase daily  ? Pain management and antiemetics as per PCP  ?  Continue to follow    Discussed with Dr. Kvng Li per Dr. Sita Mariscal [id number], FNP-BC 9/17/2018 11:16 AM For Dr. Faina Arrington

## 2018-09-17 NOTE — CONSULTS
Hepatobiliary and Pancreatic Surgery Attending History and Physical    Patient's Name/Date of Birth: Georgia Armas / 1960 (66 y.o.)    Date: September 17, 2018     CC: double duct sign with weight loss    HPI:  Patient is a very pleasant 66year old female whom was found to have a double duct signs on imaging. She had an ERCP which confirmed the same. She also had an EUS in [facility] which was negative. She represented with abdominal pain in her epigastrium with radiation to her back. Her lipase was elevated but has come down. She continues to have pain with eating. She states that her appetite is poor with constant nausea. She denies any bloating or foul smelling stools. She also has lost about 20lbs over the past few months but 70 lbs total over the course of a year. She is an occasional weekend drinker and does smoke 1 PPD.      Past Medical History:   Diagnosis Date    Livingston's esophagus 8/13/2014    CAD (coronary artery disease)     Cancer ([facility] 1991    RENAL    Diverticulitis of sigmoid colon 2/25/2014    Elevated lipase 2/27/2014    Gastric ulcer 9/10/2016    GERD (gastroesophageal reflux disease)     Hypoglycemia  9/11/2016    MI [facility] infarction[facility]; catheterization no intervention    Peptic ulcer disease 08/2014    Gastric ulcer, noncompliance with medication    Relapsing pancreatitis ([address] 9/10/2016    IDIOPATHIC ; 9/2016    Thyroid disease     Ulcer        Past Surgical History:   Procedure Laterality Date    CARDIAC SURGERY      [facility]LE[facility]Y  2014    [facility]    COLONOSCOPY  05/2014    CORONARY ANGIOPLASTY      ECHOCARDIOGRAM COMPLETE 2D W DOPPLER W COLOR  1/16/2013         ENDOSCOPY, COLON, DIAGNOSTIC      ERCP  08/2017    Stricture, papillotomy, removal 3 stones    HYSTERECTOMY  age 27   26 [facility]    UPPER GASTROINTESTINAL ENDOSCOPY  08/14/2017    WISDOM TOOTH EXTRACTION  dextrose 50 % solution 12.5 g  12.5 g Intravenous PRN Hank Bolds Mihok, DO        glucagon (rDNA) injection 1 mg  1 mg Intramuscular PRN Hank Bolds Mihok, DO        dextrose 5 % solution  100 mL/hr Intravenous PRN Hank Bolds Mihok, DO        ondansetron (ZOFRAN) injection 4 mg  4 mg Intravenous Q6H PRN Hank Bolds Mihok, DO   4 mg at 09/15/18 1506    morphine (PF) injection 2 mg  2 mg Intravenous Q2H PRN Hank Bolds Mihok, DO   2 mg at 09/17/18 1445       Allergies   Allergen Reactions    Darvocet [Propoxyphene N-Acetaminophen]     Darvon [Propoxyphene Hcl]     Percocet [Oxycodone-Acetaminophen]     Dilaudid [Hydromorphone Hcl] Nausea And Vomiting       Family History   Problem Relation Age of Onset    Diabetes Mother     Thyroid Disease Mother     Cancer Father     Lung Cancer Father        Social History     Social History    Marital status:      Spouse name: N/A    Number of children: N/A    Years of education: N/A     Occupational History    Not on file. Social History Main Topics    Smoking status: Current Every Day Smoker     Packs/day: 1.00     Years: 40.00     Types: Cigarettes     Start date: 8/10/1982    Smokeless tobacco: Never Used    Alcohol use Yes      Comment: social    Drug use: No    Sexual activity: Yes     Partners: Male     Other Topics Concern    Not on file     Social History Narrative    No narrative on file       ROS:   Review of Systems   Constitutional: Positive for malaise/fatigue and weight loss. Negative for chills, diaphoresis and fever. HENT: Negative for congestion, ear discharge, ear pain, hearing loss, nosebleeds and tinnitus. Eyes: Negative for double vision, photophobia, pain and discharge. Respiratory: Negative for hemoptysis, sputum production and shortness of breath. Cardiovascular: Negative for palpitations, orthopnea, claudication and leg swelling. Gastrointestinal: Positive for abdominal pain.  Negative for blood in stool, constipation, diarrhea, heartburn, melena, nausea and vomiting. Genitourinary: Negative for frequency, hematuria and urgency. Musculoskeletal: Positive for back pain. Negative for joint pain and neck pain. Skin: Negative for itching and rash. Neurological: Negative for tingling, tremors, sensory change, speech change, focal weakness and seizures. Endo/Heme/Allergies: Negative for environmental allergies and polydipsia. Psychiatric/Behavioral: Negative for hallucinations, substance abuse and suicidal ideas. The patient is not nervous/anxious and does not have insomnia. Physical Exam:  Vitals:    09/17/18 0730   BP: 130/64   Pulse: 72   Resp: 16   Temp: 97.7 °F (36.5 °C)   SpO2:        PSYCH: mood and affect normal, alert and oriented x 3  CONSTITUTIONAL: No apparent distress, comfortable  EYES: Sclera white, pupils equal round and reactive to light  ENMT:  Hearing normal, trachea midline, ears externally intact  LYMPH: no lympadenopathy in neck. No lympadenopathy in groins  RESP: Breath sounds were clear and equal with no rales, wheezes, or rhonchi. Respiratory effort was normal with no retractions or use of accessory muscles. CV: Heart sounds were normal with a regular rate and rhythm. No pedal edema  GI/ Abdomen: soft, epigastric tenderness, no gaurding, no peritoneal signs  MSK: no clubbing/ no cyanosis/ gait normal       Assessment/Plan:  Acute recurrent pancreatitis with a double duct sign  - we discussed that she should have another EUS as a second opinon  - she definitely has a an enlarged pancreatic duct. - I will arrange for this to be done with Dr. Reid Higgins and his colleagues electively  - tumor markers pending  - advance diet as tolerated  - agree with creon     Minutes of which greater than 50% was spent counseling or coordinating her care. Thank you for the consultation allowing me to take part in Ms. Doyle's care.      Jacy Chan M.D.  9/17/2018  3:14 PM

## 2018-09-18 LAB
ALBUMIN SERPL-MCNC: 3.4 G/DL (ref 3.5–5.2)
ALP BLD-CCNC: 61 U/L (ref 35–104)
ALPHA-1 ANTITRYPSIN: 153 MG/DL (ref 90–200)
ALT SERPL-CCNC: 11 U/L (ref 0–32)
ANCA IFA: NORMAL
ANION GAP SERPL CALCULATED.3IONS-SCNC: 10 MMOL/L (ref 7–16)
AST SERPL-CCNC: 15 U/L (ref 0–31)
BASOPHILS ABSOLUTE: 0.03 E9/L (ref 0–0.2)
BASOPHILS RELATIVE PERCENT: 0.4 % (ref 0–2)
BILIRUB SERPL-MCNC: 0.4 MG/DL (ref 0–1.2)
BLOOD CULTURE, ROUTINE: NORMAL
BUN BLDV-MCNC: 5 MG/DL (ref 6–20)
C-REACTIVE PROTEIN: 0.7 MG/DL (ref 0–0.4)
CA 19-9: 9 U/ML (ref 0–37)
CALCIUM SERPL-MCNC: 8.6 MG/DL (ref 8.6–10.2)
CHLORIDE BLD-SCNC: 106 MMOL/L (ref 98–107)
CO2: 24 MMOL/L (ref 22–29)
CREAT SERPL-MCNC: 0.8 MG/DL (ref 0.5–1)
EOSINOPHILS ABSOLUTE: 0.2 E9/L (ref 0.05–0.5)
EOSINOPHILS RELATIVE PERCENT: 2.6 % (ref 0–6)
GFR AFRICAN AMERICAN: >60
GFR NON-AFRICAN AMERICAN: >60 ML/MIN/1.73
GLUCOSE BLD-MCNC: 84 MG/DL (ref 74–109)
HCT VFR BLD CALC: 35.6 % (ref 34–48)
HEMOGLOBIN: 12.2 G/DL (ref 11.5–15.5)
IGG 1: 402 MG/DL (ref 240–1118)
IGG 2: 279 MG/DL (ref 124–549)
IGG 3: 29 MG/DL (ref 21–134)
IGG 4: 29 MG/DL (ref 1–123)
IMMATURE GRANULOCYTES #: 0.02 E9/L
IMMATURE GRANULOCYTES %: 0.3 % (ref 0–5)
LIPASE: 15 U/L (ref 13–60)
LYMPHOCYTES ABSOLUTE: 2.18 E9/L (ref 1.5–4)
LYMPHOCYTES RELATIVE PERCENT: 28.1 % (ref 20–42)
MCH RBC QN AUTO: 31.4 PG (ref 26–35)
MCHC RBC AUTO-ENTMCNC: 34.3 % (ref 32–34.5)
MCV RBC AUTO: 91.8 FL (ref 80–99.9)
METER GLUCOSE: 119 MG/DL (ref 70–110)
METER GLUCOSE: 87 MG/DL (ref 70–110)
METER GLUCOSE: 93 MG/DL (ref 70–110)
METER GLUCOSE: 97 MG/DL (ref 70–110)
MONOCYTES ABSOLUTE: 0.38 E9/L (ref 0.1–0.95)
MONOCYTES RELATIVE PERCENT: 4.9 % (ref 2–12)
NEUTROPHILS ABSOLUTE: 4.94 E9/L (ref 1.8–7.3)
NEUTROPHILS RELATIVE PERCENT: 63.7 % (ref 43–80)
PDW BLD-RTO: 12.7 FL (ref 11.5–15)
PLATELET # BLD: 186 E9/L (ref 130–450)
PMV BLD AUTO: 10.4 FL (ref 7–12)
POTASSIUM SERPL-SCNC: 3.8 MMOL/L (ref 3.5–5)
RBC # BLD: 3.88 E12/L (ref 3.5–5.5)
SODIUM BLD-SCNC: 140 MMOL/L (ref 132–146)
TOTAL PROTEIN: 5.7 G/DL (ref 6.4–8.3)
WBC # BLD: 7.8 E9/L (ref 4.5–11.5)

## 2018-09-18 PROCEDURE — 6370000000 HC RX 637 (ALT 250 FOR IP): Performed by: CLINICAL NURSE SPECIALIST

## 2018-09-18 PROCEDURE — 80053 COMPREHEN METABOLIC PANEL: CPT

## 2018-09-18 PROCEDURE — 86140 C-REACTIVE PROTEIN: CPT

## 2018-09-18 PROCEDURE — 99232 SBSQ HOSP IP/OBS MODERATE 35: CPT | Performed by: TRANSPLANT SURGERY

## 2018-09-18 PROCEDURE — 1200000000 HC SEMI PRIVATE

## 2018-09-18 PROCEDURE — 83690 ASSAY OF LIPASE: CPT

## 2018-09-18 PROCEDURE — 85025 COMPLETE CBC W/AUTO DIFF WBC: CPT

## 2018-09-18 PROCEDURE — 36415 COLL VENOUS BLD VENIPUNCTURE: CPT

## 2018-09-18 PROCEDURE — 6360000002 HC RX W HCPCS: Performed by: INTERNAL MEDICINE

## 2018-09-18 PROCEDURE — 6370000000 HC RX 637 (ALT 250 FOR IP): Performed by: INTERNAL MEDICINE

## 2018-09-18 PROCEDURE — 2580000003 HC RX 258: Performed by: INTERNAL MEDICINE

## 2018-09-18 PROCEDURE — 82962 GLUCOSE BLOOD TEST: CPT

## 2018-09-18 RX ORDER — ONDANSETRON 4 MG/1
4 TABLET, ORALLY DISINTEGRATING ORAL EVERY 6 HOURS PRN
Qty: 60 TABLET | Refills: 1 | Status: SHIPPED | OUTPATIENT
Start: 2018-09-18

## 2018-09-18 RX ORDER — SUCRALFATE 1 G/1
1 TABLET ORAL
Qty: 60 TABLET | Refills: 1 | Status: SHIPPED | OUTPATIENT
Start: 2018-09-18

## 2018-09-18 RX ADMIN — METOCLOPRAMIDE 10 MG: 5 INJECTION, SOLUTION INTRAMUSCULAR; INTRAVENOUS at 23:58

## 2018-09-18 RX ADMIN — MORPHINE SULFATE 2 MG: 2 INJECTION, SOLUTION INTRAMUSCULAR; INTRAVENOUS at 09:28

## 2018-09-18 RX ADMIN — MORPHINE SULFATE 2 MG: 2 INJECTION, SOLUTION INTRAMUSCULAR; INTRAVENOUS at 19:43

## 2018-09-18 RX ADMIN — Medication 10 ML: at 17:38

## 2018-09-18 RX ADMIN — METOCLOPRAMIDE 10 MG: 5 INJECTION, SOLUTION INTRAMUSCULAR; INTRAVENOUS at 13:25

## 2018-09-18 RX ADMIN — MORPHINE SULFATE 2 MG: 2 INJECTION, SOLUTION INTRAMUSCULAR; INTRAVENOUS at 23:58

## 2018-09-18 RX ADMIN — Medication 10 ML: at 20:35

## 2018-09-18 RX ADMIN — MORPHINE SULFATE 2 MG: 2 INJECTION, SOLUTION INTRAMUSCULAR; INTRAVENOUS at 17:38

## 2018-09-18 RX ADMIN — MORPHINE SULFATE 2 MG: 2 INJECTION, SOLUTION INTRAMUSCULAR; INTRAVENOUS at 13:25

## 2018-09-18 RX ADMIN — ENOXAPARIN SODIUM 40 MG: 40 INJECTION SUBCUTANEOUS at 08:20

## 2018-09-18 RX ADMIN — PANTOPRAZOLE SODIUM 40 MG: 40 TABLET, DELAYED RELEASE ORAL at 17:38

## 2018-09-18 RX ADMIN — METOCLOPRAMIDE 10 MG: 5 INJECTION, SOLUTION INTRAMUSCULAR; INTRAVENOUS at 09:28

## 2018-09-18 RX ADMIN — SUCRALFATE 1 G: 1 TABLET ORAL at 06:04

## 2018-09-18 RX ADMIN — METOCLOPRAMIDE 10 MG: 5 INJECTION, SOLUTION INTRAMUSCULAR; INTRAVENOUS at 17:38

## 2018-09-18 RX ADMIN — MAGNESIUM HYDROXIDE 30 ML: 400 SUSPENSION ORAL at 08:19

## 2018-09-18 RX ADMIN — PANTOPRAZOLE SODIUM 40 MG: 40 TABLET, DELAYED RELEASE ORAL at 06:04

## 2018-09-18 RX ADMIN — PANCRELIPASE 3 CAPSULE: 60000; 12000; 38000 CAPSULE, DELAYED RELEASE PELLETS ORAL at 08:20

## 2018-09-18 RX ADMIN — METOCLOPRAMIDE 10 MG: 5 INJECTION, SOLUTION INTRAMUSCULAR; INTRAVENOUS at 05:21

## 2018-09-18 RX ADMIN — MORPHINE SULFATE 2 MG: 2 INJECTION, SOLUTION INTRAMUSCULAR; INTRAVENOUS at 05:20

## 2018-09-18 RX ADMIN — SUCRALFATE 1 G: 1 TABLET ORAL at 12:14

## 2018-09-18 RX ADMIN — SUCRALFATE 1 G: 1 TABLET ORAL at 17:38

## 2018-09-18 RX ADMIN — PANCRELIPASE 3 CAPSULE: 60000; 12000; 38000 CAPSULE, DELAYED RELEASE PELLETS ORAL at 17:37

## 2018-09-18 ASSESSMENT — PAIN DESCRIPTION - PROGRESSION: CLINICAL_PROGRESSION: NOT CHANGED

## 2018-09-18 ASSESSMENT — PAIN SCALES - GENERAL
PAINLEVEL_OUTOF10: 6
PAINLEVEL_OUTOF10: 7
PAINLEVEL_OUTOF10: 2
PAINLEVEL_OUTOF10: 6
PAINLEVEL_OUTOF10: 7
PAINLEVEL_OUTOF10: 5

## 2018-09-18 ASSESSMENT — PAIN DESCRIPTION - ONSET: ONSET: ON-GOING

## 2018-09-18 ASSESSMENT — PAIN DESCRIPTION - ORIENTATION: ORIENTATION: LEFT;UPPER

## 2018-09-18 ASSESSMENT — PAIN DESCRIPTION - PAIN TYPE: TYPE: ACUTE PAIN

## 2018-09-18 ASSESSMENT — PAIN DESCRIPTION - LOCATION: LOCATION: ABDOMEN

## 2018-09-18 ASSESSMENT — PAIN DESCRIPTION - FREQUENCY: FREQUENCY: CONTINUOUS

## 2018-09-18 ASSESSMENT — PAIN DESCRIPTION - DESCRIPTORS: DESCRIPTORS: SQUEEZING

## 2018-09-18 NOTE — PROGRESS NOTES
Common bile ducts - suggest double duct sign; MRCP is severely limited due to multiple surgical clips adjacent to the common duct causing susceptibility artifact per radiologist report (MRCP) -EUS 9/11/17 negative      Plan:     ? MRCP above noted - Findings suggest a double duct sign per report. EUS negative 9/11/17  ? Triphasic CT noted - also indicating double duct sign - Dr Osmar Davila input noted, pt will be scheduled for outpatient EUS. ? Low fat diet  ? Creon as ordered  ? Continue Protonix as ordered  ? Monitor CBC, CMP and Lipase daily  ? Pain management and antiemetics as per PCP  ? Discharge per PCP, ok from gi pov with outpatient follow up. Have labs done 3 days prior to follow up appt.        Discussed with Dr. Judy Hanson per Dr. Jac Lefort [id number], FNP-BC 9/18/2018 8:11 AM For Dr. Milli Luque

## 2018-09-18 NOTE — PROGRESS NOTES
24 hrs pending adequate oral intake and improved pain.           Julián Angela DO  6:58 AM  9/18/2018

## 2018-09-19 VITALS
DIASTOLIC BLOOD PRESSURE: 72 MMHG | SYSTOLIC BLOOD PRESSURE: 134 MMHG | HEART RATE: 71 BPM | TEMPERATURE: 98.5 F | OXYGEN SATURATION: 94 % | RESPIRATION RATE: 16 BRPM | WEIGHT: 147.25 LBS | BODY MASS INDEX: 25.14 KG/M2 | HEIGHT: 64 IN

## 2018-09-19 LAB
ALBUMIN SERPL-MCNC: 3.3 G/DL (ref 3.5–5.2)
ALP BLD-CCNC: 62 U/L (ref 35–104)
ALT SERPL-CCNC: 12 U/L (ref 0–32)
ANION GAP SERPL CALCULATED.3IONS-SCNC: 10 MMOL/L (ref 7–16)
AST SERPL-CCNC: 14 U/L (ref 0–31)
BASOPHILS ABSOLUTE: 0.04 E9/L (ref 0–0.2)
BASOPHILS RELATIVE PERCENT: 0.6 % (ref 0–2)
BILIRUB SERPL-MCNC: 0.5 MG/DL (ref 0–1.2)
BUN BLDV-MCNC: 5 MG/DL (ref 6–20)
C-REACTIVE PROTEIN: 1.4 MG/DL (ref 0–0.4)
CALCIUM SERPL-MCNC: 8.7 MG/DL (ref 8.6–10.2)
CHLORIDE BLD-SCNC: 107 MMOL/L (ref 98–107)
CO2: 26 MMOL/L (ref 22–29)
CREAT SERPL-MCNC: 0.8 MG/DL (ref 0.5–1)
EOSINOPHILS ABSOLUTE: 0.24 E9/L (ref 0.05–0.5)
EOSINOPHILS RELATIVE PERCENT: 3.4 % (ref 0–6)
GFR AFRICAN AMERICAN: >60
GFR NON-AFRICAN AMERICAN: >60 ML/MIN/1.73
GLUCOSE BLD-MCNC: 84 MG/DL (ref 74–109)
HCT VFR BLD CALC: 36.4 % (ref 34–48)
HEMOGLOBIN: 12.4 G/DL (ref 11.5–15.5)
IMMATURE GRANULOCYTES #: 0.02 E9/L
IMMATURE GRANULOCYTES %: 0.3 % (ref 0–5)
LIPASE: 11 U/L (ref 13–60)
LYMPHOCYTES ABSOLUTE: 2.31 E9/L (ref 1.5–4)
LYMPHOCYTES RELATIVE PERCENT: 32.6 % (ref 20–42)
MCH RBC QN AUTO: 31.6 PG (ref 26–35)
MCHC RBC AUTO-ENTMCNC: 34.1 % (ref 32–34.5)
MCV RBC AUTO: 92.9 FL (ref 80–99.9)
METER GLUCOSE: 77 MG/DL (ref 70–110)
METER GLUCOSE: 85 MG/DL (ref 70–110)
METER GLUCOSE: 89 MG/DL (ref 70–110)
MONOCYTES ABSOLUTE: 0.34 E9/L (ref 0.1–0.95)
MONOCYTES RELATIVE PERCENT: 4.8 % (ref 2–12)
NEUTROPHILS ABSOLUTE: 4.13 E9/L (ref 1.8–7.3)
NEUTROPHILS RELATIVE PERCENT: 58.3 % (ref 43–80)
PDW BLD-RTO: 13 FL (ref 11.5–15)
PLATELET # BLD: 192 E9/L (ref 130–450)
PMV BLD AUTO: 10.1 FL (ref 7–12)
POTASSIUM SERPL-SCNC: 4 MMOL/L (ref 3.5–5)
RBC # BLD: 3.92 E12/L (ref 3.5–5.5)
SODIUM BLD-SCNC: 143 MMOL/L (ref 132–146)
TOTAL PROTEIN: 5.9 G/DL (ref 6.4–8.3)
WBC # BLD: 7.1 E9/L (ref 4.5–11.5)

## 2018-09-19 PROCEDURE — 80053 COMPREHEN METABOLIC PANEL: CPT

## 2018-09-19 PROCEDURE — 6370000000 HC RX 637 (ALT 250 FOR IP): Performed by: INTERNAL MEDICINE

## 2018-09-19 PROCEDURE — 85025 COMPLETE CBC W/AUTO DIFF WBC: CPT

## 2018-09-19 PROCEDURE — 82962 GLUCOSE BLOOD TEST: CPT

## 2018-09-19 PROCEDURE — 36415 COLL VENOUS BLD VENIPUNCTURE: CPT

## 2018-09-19 PROCEDURE — 2580000003 HC RX 258: Performed by: INTERNAL MEDICINE

## 2018-09-19 PROCEDURE — 6370000000 HC RX 637 (ALT 250 FOR IP): Performed by: CLINICAL NURSE SPECIALIST

## 2018-09-19 PROCEDURE — 6360000002 HC RX W HCPCS: Performed by: INTERNAL MEDICINE

## 2018-09-19 PROCEDURE — 83690 ASSAY OF LIPASE: CPT

## 2018-09-19 PROCEDURE — 86140 C-REACTIVE PROTEIN: CPT

## 2018-09-19 RX ADMIN — PANTOPRAZOLE SODIUM 40 MG: 40 TABLET, DELAYED RELEASE ORAL at 16:29

## 2018-09-19 RX ADMIN — PANCRELIPASE 3 CAPSULE: 60000; 12000; 38000 CAPSULE, DELAYED RELEASE PELLETS ORAL at 08:37

## 2018-09-19 RX ADMIN — Medication 10 ML: at 08:37

## 2018-09-19 RX ADMIN — MORPHINE SULFATE 2 MG: 2 INJECTION, SOLUTION INTRAMUSCULAR; INTRAVENOUS at 05:30

## 2018-09-19 RX ADMIN — MAGNESIUM HYDROXIDE 30 ML: 400 SUSPENSION ORAL at 08:37

## 2018-09-19 RX ADMIN — SODIUM CHLORIDE: 9 INJECTION, SOLUTION INTRAVENOUS at 10:27

## 2018-09-19 RX ADMIN — ENOXAPARIN SODIUM 40 MG: 40 INJECTION SUBCUTANEOUS at 08:37

## 2018-09-19 RX ADMIN — PANTOPRAZOLE SODIUM 40 MG: 40 TABLET, DELAYED RELEASE ORAL at 05:30

## 2018-09-19 RX ADMIN — SUCRALFATE 1 G: 1 TABLET ORAL at 16:29

## 2018-09-19 RX ADMIN — SODIUM CHLORIDE: 9 INJECTION, SOLUTION INTRAVENOUS at 00:00

## 2018-09-19 RX ADMIN — METOCLOPRAMIDE 10 MG: 5 INJECTION, SOLUTION INTRAMUSCULAR; INTRAVENOUS at 16:30

## 2018-09-19 RX ADMIN — SUCRALFATE 1 G: 1 TABLET ORAL at 05:30

## 2018-09-19 RX ADMIN — MORPHINE SULFATE 2 MG: 2 INJECTION, SOLUTION INTRAMUSCULAR; INTRAVENOUS at 10:33

## 2018-09-19 RX ADMIN — MORPHINE SULFATE 2 MG: 2 INJECTION, SOLUTION INTRAMUSCULAR; INTRAVENOUS at 16:29

## 2018-09-19 RX ADMIN — METOCLOPRAMIDE 10 MG: 5 INJECTION, SOLUTION INTRAMUSCULAR; INTRAVENOUS at 05:30

## 2018-09-19 RX ADMIN — SUCRALFATE 1 G: 1 TABLET ORAL at 11:14

## 2018-09-19 ASSESSMENT — PAIN DESCRIPTION - LOCATION: LOCATION: ABDOMEN

## 2018-09-19 ASSESSMENT — PAIN DESCRIPTION - ONSET: ONSET: ON-GOING

## 2018-09-19 ASSESSMENT — PAIN SCALES - GENERAL
PAINLEVEL_OUTOF10: 6
PAINLEVEL_OUTOF10: 0
PAINLEVEL_OUTOF10: 0
PAINLEVEL_OUTOF10: 6
PAINLEVEL_OUTOF10: 5
PAINLEVEL_OUTOF10: 7

## 2018-09-19 ASSESSMENT — PAIN DESCRIPTION - FREQUENCY: FREQUENCY: CONTINUOUS

## 2018-09-19 ASSESSMENT — PAIN DESCRIPTION - ORIENTATION: ORIENTATION: LEFT

## 2018-09-19 ASSESSMENT — PAIN DESCRIPTION - PROGRESSION: CLINICAL_PROGRESSION: GRADUALLY IMPROVING

## 2018-09-19 ASSESSMENT — PAIN DESCRIPTION - PAIN TYPE: TYPE: ACUTE PAIN

## 2018-09-19 NOTE — PATIENT CARE CONFERENCE
[facility] Quality Flow/Interdisciplinary Rounds Progress Note        Quality Flow Rounds held on September 19, 2018    Disciplines Attending:  Bedside Nurse, ,  and Nursing Unit Leadership    Jori Campbell was admitted on 9/13/2018  2:13 PM    Anticipated Discharge Date:  Expected Discharge Date: 09/16/18    Disposition:    Js Score:  Js Scale Score: 21    Readmission Risk              Risk of Unplanned Readmission:        10             Discussed patient goal for the day, patient clinical progression, and barriers to discharge.   The following Goal(s) of the Day/Commitment(s) have been identified:  Discharge planning        Dionna Barton  September 19, 2018

## 2018-09-19 NOTE — PROGRESS NOTES
3.4 2018    LABALBU 4.3 2012     No components found for: CHLPL    Lab Results   Component Value Date    TRIG 123 2018    TRIG 166 (H) 2016    TRIG 113 2013       Lab Results   Component Value Date    HDL 30 2018    HDL 27 2016    HDL 36.9 (A) 2013       Lab Results   Component Value Date    LDLCALC 84 2018    LDLCALC 138 (H) 2016    LDLCALC 149 (H) 2013       Lab Results   Component Value Date    LABVLDL 25 2018    LABVLDL 33 2016      PT/INR:    Lab Results   Component Value Date    PROTIME 11.8 08/15/2017    PROTIME 10.9 2011    INR 1.1 08/15/2017     Mri Abdomen W Wo Contrast Mrcp    Result Date: 2018  Patient MRN:  [id number] : 1960 Age: 66 years Gender: Female Order Date:  2018 11:15 AM EXAM: MRI ABDOMEN W WO CONTRAST MRCP NUMBER OF IMAGES \ views:  850 INDICATION:  ABNORMAL LIVER FUNCTION TESTS COMPARISON: CT scan 2018 17  Sequences are submitted Multiple sequence of the abdomen with sagittal and coronal MPR reconstructions were obtained from the top of the diaphragm to the bottom of the kidneys. The visualized portions of the abdomen reveal: The liver is unremarkable  there is evidence for biliary dilatation present. . The spleen is unremarkable. . The kidneys are unremarkable  . The adrenals is  unremarkable. The pancreas is abnormal. There is dilatation of the pancreatic duct . There is no significant ascites MRCP is severely limited due to multiple surgical clips adjacent to the common duct causing susceptibility artifact. The common duct appears to be mildly dilated. Findings suggest a double duct sign. No discrete mass is seen at the level of the pancreatic head. No definite enhancing lesion is seen       Assessment:     Principal Problem:    Relapsing pancreatitis [facility])  Active Problems:  ? Diverticulosis  ? Hx PUD, Livingston's esophagus, GERD--EGD 17 did not reveal Livingston's  ?  S/P ERCP with papillotomy 8/16/17 with CBD dilatation and balloon sweeping for removal of 3 small stones; ? Double duct sign referred for EUS (EUS performed 9/11/17 and ws essentially within normal limits)  ? HLD  ? Hx of cholecystectomy  ? Hx of renal cell CA S/P partial nephrectomy in 1991  ? Dilated Pancreatic and Common bile ducts - suggest double duct sign; MRCP is severely limited due to multiple surgical clips adjacent to the common duct causing susceptibility artifact per radiologist report (MRCP) -EUS 9/11/17 negative      Plan:     ? MRCP above noted - Findings suggest a double duct sign per report. EUS negative 9/11/17  ? Triphasic CT noted - also indicating double duct sign - Dr Heriberto Hernandez input noted, pt will be scheduled for outpatient EUS. ? Low fat diet  ? Creon as ordered  ? Continue Protonix as ordered  ? Monitor CBC, CMP and Lipase daily  ? Pain management and antiemetics as per PCP  ? Discharge per PCP, ok from gi pov with outpatient follow up. Have labs done 3 days prior to follow up appt.        Discussed with Dr. Miguelina Owen per Dr. Beltran Huitron [id number], FNP-BC 9/19/2018 10:08 AM For Dr. Rick Benavidez

## 2018-09-19 NOTE — PROGRESS NOTES
9/19/2018  5:04 PM      Nutrition Education    Type and Reason for Visit: Consult, Patient Education    Patient stated she has been following the [address] and eats gluten-free. She reports that she has been losing weight unintentionally because she has had too much pain to eat with the chronic pancreatitis. Reviewed rationale for choosing lean, low fat meats and other low fat foods and encouraged pt to trial Ensure Clear after discharge, if she feels unable to eat due to pain. This ONS is fat-free and would contribute calories/protein when pt not able to eat regular meals. · Verbally reviewed following information with patient: Low fat diet  · Written educational materials provided. Also provided Low Fat Sample menus  · Contact name and number provided.     Electronically signed by Nisha Frank RD, CNSC, LD on 9/19/18 at 5:04 PM    Contact Number: [phone]

## 2018-09-19 NOTE — PROGRESS NOTES
Chief Complaint:  Abdominal and back pain    Subjective: The patient is alert. Less nausea with emesis this AM. Denies chest pain & SOB . No vomiting. Poor appetite. Objective:    Vitals:    18 0729   BP: 134/72   Pulse: 71   Resp: 16   Temp: 98.5 °F (36.9 °C)   SpO2: 94%     A&O x's 3  Heart:  RRR, no murmurs, gallops, or rubs. Lungs:  CTA bilaterally, no wheeze, rales or rhonchi  Abd: bowel sounds present, mild generalized tenderness, nondistended, no masses, no RRG  Extrem:  No clubbing, cyanosis, or edema      Lab Results   Component Value Date     2018    K 4.0 2018     2018    CO2 26 2018    BUN 5 2018    CREATININE 0.8 2018    CALCIUM 8.7 2018      Lab Results   Component Value Date    WBC 7.1 2018    RBC 3.92 2018    HGB 12.4 2018    HCT 36.4 2018    MCV 92.9 2018    MCH 31.6 2018    MCHC 34.1 2018    RDW 13.0 2018     2018    MPV 10.1 2018     PT/INR:    Lab Results   Component Value Date    PROTIME 11.8 08/15/2017    PROTIME 10.9 2011    INR 1.1 08/15/2017     No results for input(s): POCGLU in the last 72 hours. Recent Labs      18   0700  18   0404  18   1100   NA  139  140  143   K  4.2  3.8  4.0   CL  107  106  107   CO2  24  24  26   BUN  8  5*  5*   LABALBU  3.2*  3.4*  3.3*   CREATININE  0.9  0.8  0.8   CALCIUM  8.3*  8.6  8.7   GFRAA  >60  >60  >60   LABGLOM  >60  >60  >60   GLUCOSE  88  84  84       Ct Abdomen Pelvis W Iv Contrast Additional Contrast? None    Result Date: 2018  Patient MRN:  [id number] : 1960 Age: 66 years Gender: Female EXAM: CT ABDOMEN PELVIS W IV CONTRAST INDICATION:  abdominal pain  COMPARISON: 2017 TECHNIQUE: Low-dose CT  acquisition technique included one of following options: 1 . Automated exposure control 2. Adjustment of MA and or KV according to patient's size or 3. Use of iterative reconstruction. FINDINGS: Lung bases are clear. Mild prominence of the intrahepatic common hepatic and common bile duct dilatation identified. Evidence of previous cholecystectomy. The pancreatic duct also distended measuring 6 mm in greatest dimension. No calcific density identified along the course of the common bile duct. Scattered hepatic cysts throughout the liver are unchanged. Pepin Rotunda Spleen, adrenal glands, kidneys, pancreas are otherwise unremarkable. Question small hiatal hernia. Mild retained stool throughout the colon without bowel obstruction. Mild-to-moderate sigmoid diverticulosis. Urinary bladder unremarkable. Uterus absent. No adnexal mass. No free air or free fluid. Moderate severe degenerative changes lumbar spine L5 S1 and at L1-2. Dilatation of the intrahepatic, extrahepatic ducts, common bile duct and the pancreatic duct. Please correlate with LFTs and consider further evaluation as clinically warranted. No peripancreatic fluid collections or edema this time on this examination. Mri Abdomen W Wo Contrast Mrcp    Result Date: 2018  Patient MRN:  [id number] : 1960 Age: 66 years Gender: Female Order Date:  2018 11:15 AM EXAM: MRI ABDOMEN W WO CONTRAST MRCP NUMBER OF IMAGES \ views:  850 INDICATION:  ABNORMAL LIVER FUNCTION TESTS COMPARISON: CT scan 2018 17  Sequences are submitted Multiple sequence of the abdomen with sagittal and coronal MPR reconstructions were obtained from the top of the diaphragm to the bottom of the kidneys. The visualized portions of the abdomen reveal: The liver is unremarkable  there is evidence for biliary dilatation present. . The spleen is unremarkable. . The kidneys are unremarkable  . The adrenals is  unremarkable. The pancreas is abnormal. There is dilatation of the pancreatic duct . There is no significant ascites MRCP is severely limited due to multiple surgical clips adjacent to the common duct causing susceptibility artifact.  The common duct appears to be mildly dilated. Findings suggest a double duct sign. No discrete mass is seen at the level of the pancreatic head. No definite enhancing lesion is seen       Scheduled Meds:   magnesium hydroxide  30 mL Oral Daily    lipase-protease-amylase  3 capsule Oral TID     lipase-protease-amylase  2 capsule Oral BID    sodium chloride flush  10 mL Intravenous BID    pantoprazole  40 mg Oral BID AC    sucralfate  1 g Oral 4 times per day    nicotine  1 patch Transdermal Daily    enoxaparin  40 mg Subcutaneous Daily    insulin lispro  0-6 Units Subcutaneous TID     insulin lispro  0-3 Units Subcutaneous Nightly     Continuous Infusions:   sodium chloride 100 mL/hr at 09/19/18 1027    dextrose       PRN Meds:.metoclopramide, sodium chloride flush, dicyclomine, ondansetron, glucose, dextrose, glucagon (rDNA), dextrose, ondansetron, morphine    I/O last 3 completed shifts: In: 7969 [P.O.:1220; I.V.:1200]  Out: 2600 [Urine:2600]  No intake/output data recorded.     Intake/Output Summary (Last 24 hours) at 09/19/18 1248  Last data filed at 09/19/18 0530   Gross per 24 hour   Intake             1440 ml   Output             1600 ml   Net             -160 ml       Assessment:    Active Hospital Problems    Diagnosis    Relapsing pancreatitis ([facility] [K86.1]     Priority: High     Class: Acute    Gastric ulcer [K25.9]     Priority: High     Class: Chronic    Acute recurrent pancreatitis [K85.90]    Chronic relapsing pancreatitis ([facility] [K86.1]    Choledocholithiasis [K80.50]    Livingston's esophagus [K22.70]    GERD (gastroesophageal reflux disease) [K21.9]    Coronary artery disease involving native coronary artery [I25.10]    MI (myocardial infarction) ([facility] [I21.9]    Cancer ([facility] [C80.1]       Plan:  GI f/u note labs and xray's reviewed             Continue medical tx and pain management             BS well controlled             Amylase and lipase nl             Surgical consult reviewed--repeat EUS as

## 2018-09-19 NOTE — PLAN OF CARE
Problem: Pain:  Goal: Pain level will decrease  Pain level will decrease   Outcome: Met This Shift    Goal: Control of acute pain  Control of acute pain   Outcome: Met This Shift      Problem: Nausea/Vomiting:  Goal: Absence of nausea/vomiting  Absence of nausea/vomiting   Outcome: Met This Shift

## 2018-09-19 NOTE — DISCHARGE SUMMARY
Physician Discharge Summary     Patient ID:  Jori Campbell  [id number]  45 y.o.  1960    Admit date: 9/13/2018    Discharge date and time: 9/19/2018     Admission Diagnoses: Acute recurrent pancreatitis [K85.90]  Acute recurrent pancreatitis [K85.90]  Chronic relapsing pancreatitis [facility]) [K86.1]    Discharge Diagnoses: Active Hospital Problems    Diagnosis    Relapsing pancreatitis ([facility] [K86.1]     Priority: High     Class: Acute    Gastric ulcer [K25.9]     Priority: High     Class: Chronic    Acute recurrent pancreatitis [K85.90]    Chronic relapsing pancreatitis ([facility] [K86.1]    Choledocholithiasis [K80.50]    Livingston's esophagus [K22.70]    GERD (gastroesophageal reflux disease) [K21.9]    Coronary artery disease involving native coronary artery [I25.10]    MI (myocardial infarction) ([facility] [I21.9]    Cancer ([facility] [C80.1]       Consults: GI(Reji) and general surgery(Gonzalo)    Procedures: CT: Impression  Findings remain compatible with a double duct sign, no pancreatic head  mass identified     MRI abdomen: Impression  Findings suggest a double duct sign. No discrete mass is  seen at the level of the pancreatic head. No definite enhancing lesion  is seen     Hospital Course:  Admitted and treated for recurrent pancreatitis after presenting with abdominal pain. Lipase follow daily to resolution GI and oncology surgery consults obtained without intervention. MRI and abdominal CT identified a double duct sign of the pancreas. Previous EUS negative with repeat study to be scheduled on discharge. The patients pain improved although appetite and nausea still present on discharge. The patient non compliant with medical treatment in the past with compliance strongly urged on discharge today. Dietary recommendations pending as this time. The patient is discharged in improved and stable condition with OP f/u as directed. Alcohol and tobacco use discouraged.     CBC with Differential:    Lab Results This list has 2 medication(s) that are the same as other medications prescribed for you. Read the directions carefully, and ask your doctor or other care provider to review them with you. CHANGE how you take these medications    ondansetron 4 MG disintegrating tablet  Commonly known as:  ZOFRAN ODT  Take 1 tablet by mouth every 6 hours as needed for Nausea or Vomiting  What changed:  when to take this        CONTINUE taking these medications    dicyclomine 10 MG capsule  Commonly known as:  BENTYL  Take 2 capsules by mouth 3 times daily as needed (abdominal pain or cramping)     nicotine 21 MG/24HR  Commonly known as:  NICODERM CQ  Place 1 patch onto the skin daily     pantoprazole 40 MG tablet  Commonly known as:  PROTONIX  Take 1 tablet by mouth daily           Where to Get Your Medications      These medications were sent to [address] [place] [address] [phone] [facility] [phone]  [facility] [address] [address]    Phone:  [phone]   · ondansetron 4 MG disintegrating tablet  · Pancrelipase (Lip-Prot-Amyl) 36265 units Cpep  · Pancrelipase (Lip-Prot-Amyl) 29604 units Cpep  · sucralfate 1 GM tablet          Activity: activity as tolerated   Diet: low fat     Follow-up with Dr. Bailee Ndiaye in 5 days.     Note that over 30 minutes was spent in preparing discharge papers, discussing discharge with patient, medication review, etc.    Signed:  Gustavo Thompson DO  9/19/2018  4:45 PM

## 2018-11-06 ENCOUNTER — TELEPHONE (OUTPATIENT)
Dept: HEMATOLOGY | Age: 58
End: 2018-11-06

## 2024-12-20 NOTE — PROGRESS NOTES
Hepatobiliary and Pancreatic Surgery Progress Note    CC: acute recurrent pancreatitis    Subjective: Patient doing okay. States that she feels better. OBJECTIVE      Physical    VITALS:  /61   Pulse 69   Temp 98.7 °F (37.1 °C) (Oral)   Resp 16   Ht 5' 4\" (1.626 m)   Wt 148 lb 1 oz (67.2 kg)   SpO2 97%   BMI 25.41 kg/m²     General appearance: appears in no acute distress  Lungs:CTABL  Heart: RRR  Abdomen: soft, nondistended, nontympanic, no guarding, no peritoneal signs, normoactive bowel sounds  Extremities:no peripheral edema    ASSESSMENT: Acute recurrent pancreatitis with double duct sign - concerning for chronic pancreatitis    PLAN:    - continue creon  - referral placed for EUS  - okay to discharge and follow up with me after EUS    Thank you for the consultation and allowing me to take part in Ms. Doyle's care.       Danisha Brown 9/18/2018 2:17 PM Priscila Daniels APNP3 days ago     Schedule Procedure:      Please Schedule Routine (next available or patient preference)  Procedure: EGD (88830)  Diagnosis: Cirrhosis K74.60  Is patient:             Diabetic? Yes: 1/2 dose insulin day of procedure. On Trulicity and Metformin also               ANTIPLATELET / ANTICOAGULATION:  MEDICATION:  Continue Aspirin        Prophylactic Antibiotics? No  Location: [facility]  Special Instructions:   MAC Anesthesia  Dr. Figueroa

## 2025-08-18 ENCOUNTER — APPOINTMENT (OUTPATIENT)
Dept: GENERAL RADIOLOGY | Age: 65
End: 2025-08-18
Payer: COMMERCIAL

## 2025-08-18 ENCOUNTER — HOSPITAL ENCOUNTER (EMERGENCY)
Age: 65
Discharge: HOME | End: 2025-08-18
Attending: EMERGENCY MEDICINE
Payer: COMMERCIAL

## 2025-08-18 ENCOUNTER — APPOINTMENT (OUTPATIENT)
Dept: CT IMAGING | Age: 65
End: 2025-08-18
Payer: COMMERCIAL

## 2025-08-18 VITALS
WEIGHT: 145 LBS | BODY MASS INDEX: 24.16 KG/M2 | TEMPERATURE: 98.4 F | DIASTOLIC BLOOD PRESSURE: 77 MMHG | SYSTOLIC BLOOD PRESSURE: 141 MMHG | HEIGHT: 65 IN | OXYGEN SATURATION: 94 % | RESPIRATION RATE: 16 BRPM | HEART RATE: 63 BPM

## 2025-08-18 DIAGNOSIS — R10.10 PAIN OF UPPER ABDOMEN: Primary | ICD-10-CM

## 2025-08-18 DIAGNOSIS — N20.0 RIGHT RENAL STONE: ICD-10-CM

## 2025-08-18 DIAGNOSIS — K59.00 CONSTIPATION, UNSPECIFIED CONSTIPATION TYPE: ICD-10-CM

## 2025-08-18 LAB
ALBUMIN SERPL-MCNC: 4.1 G/DL (ref 3.5–5.2)
ALP SERPL-CCNC: 83 U/L (ref 35–104)
ALT SERPL-CCNC: 12 U/L (ref 0–35)
ANION GAP SERPL CALCULATED.3IONS-SCNC: 11 MMOL/L (ref 7–16)
AST SERPL-CCNC: 19 U/L (ref 0–35)
BASOPHILS # BLD: 0.07 K/UL (ref 0–0.2)
BASOPHILS NFR BLD: 1 % (ref 0–2)
BILIRUB DIRECT SERPL-MCNC: 0.2 MG/DL (ref 0–0.2)
BILIRUB INDIRECT SERPL-MCNC: 0.2 MG/DL (ref 0–1)
BILIRUB SERPL-MCNC: 0.4 MG/DL (ref 0–1.2)
BILIRUB UR QL STRIP: NEGATIVE
BUN SERPL-MCNC: 11 MG/DL (ref 8–23)
CALCIUM SERPL-MCNC: 9.4 MG/DL (ref 8.8–10.2)
CHLORIDE SERPL-SCNC: 105 MMOL/L (ref 98–107)
CLARITY UR: CLEAR
CO2 SERPL-SCNC: 22 MMOL/L (ref 22–29)
COLOR UR: YELLOW
CREAT SERPL-MCNC: 0.7 MG/DL (ref 0.5–1)
EOSINOPHIL # BLD: 0.3 K/UL (ref 0.05–0.5)
EOSINOPHILS RELATIVE PERCENT: 3 % (ref 0–6)
ERYTHROCYTE [DISTWIDTH] IN BLOOD BY AUTOMATED COUNT: 13.9 % (ref 11.5–15)
GFR, ESTIMATED: >90 ML/MIN/1.73M2
GLUCOSE SERPL-MCNC: 94 MG/DL (ref 74–99)
GLUCOSE UR STRIP-MCNC: NEGATIVE MG/DL
HCT VFR BLD AUTO: 42.8 % (ref 34–48)
HGB BLD-MCNC: 14.4 G/DL (ref 11.5–15.5)
HGB UR QL STRIP.AUTO: NEGATIVE
IMM GRANULOCYTES # BLD AUTO: 0.05 K/UL (ref 0–0.58)
IMM GRANULOCYTES NFR BLD: 1 % (ref 0–5)
KETONES UR STRIP-MCNC: ABNORMAL MG/DL
LACTATE BLDV-SCNC: 0.9 MMOL/L (ref 0.5–2.2)
LEUKOCYTE ESTERASE UR QL STRIP: NEGATIVE
LIPASE SERPL-CCNC: 20 U/L (ref 13–60)
LYMPHOCYTES NFR BLD: 2.7 K/UL (ref 1.5–4)
LYMPHOCYTES RELATIVE PERCENT: 25 % (ref 20–42)
MCH RBC QN AUTO: 31.6 PG (ref 26–35)
MCHC RBC AUTO-ENTMCNC: 33.6 G/DL (ref 32–34.5)
MCV RBC AUTO: 93.9 FL (ref 80–99.9)
MONOCYTES NFR BLD: 0.67 K/UL (ref 0.1–0.95)
MONOCYTES NFR BLD: 6 % (ref 2–12)
NEUTROPHILS NFR BLD: 65 % (ref 43–80)
NEUTS SEG NFR BLD: 7.11 K/UL (ref 1.8–7.3)
NITRITE UR QL STRIP: NEGATIVE
PH UR STRIP: 6 [PH] (ref 5–8)
PLATELET # BLD AUTO: 245 K/UL (ref 130–450)
PMV BLD AUTO: 10.6 FL (ref 7–12)
POTASSIUM SERPL-SCNC: 4.4 MMOL/L (ref 3.5–5.1)
PROT SERPL-MCNC: 7.2 G/DL (ref 6.4–8.3)
PROT UR STRIP-MCNC: NEGATIVE MG/DL
RBC # BLD AUTO: 4.56 M/UL (ref 3.5–5.5)
RBC #/AREA URNS HPF: ABNORMAL /HPF
SODIUM SERPL-SCNC: 137 MMOL/L (ref 136–145)
SP GR UR STRIP: 1.02 (ref 1–1.03)
TROPONIN I SERPL HS-MCNC: <6 NG/L (ref 0–14)
UROBILINOGEN UR STRIP-ACNC: 0.2 EU/DL (ref 0–1)
WBC #/AREA URNS HPF: ABNORMAL /HPF
WBC OTHER # BLD: 10.9 K/UL (ref 4.5–11.5)

## 2025-08-18 PROCEDURE — 99285 EMERGENCY DEPT VISIT HI MDM: CPT

## 2025-08-18 PROCEDURE — 6360000002 HC RX W HCPCS

## 2025-08-18 PROCEDURE — 71045 X-RAY EXAM CHEST 1 VIEW: CPT

## 2025-08-18 PROCEDURE — 84484 ASSAY OF TROPONIN QUANT: CPT

## 2025-08-18 PROCEDURE — 82248 BILIRUBIN DIRECT: CPT

## 2025-08-18 PROCEDURE — 93005 ELECTROCARDIOGRAM TRACING: CPT | Performed by: EMERGENCY MEDICINE

## 2025-08-18 PROCEDURE — 83605 ASSAY OF LACTIC ACID: CPT

## 2025-08-18 PROCEDURE — 80053 COMPREHEN METABOLIC PANEL: CPT

## 2025-08-18 PROCEDURE — 81001 URINALYSIS AUTO W/SCOPE: CPT

## 2025-08-18 PROCEDURE — 74177 CT ABD & PELVIS W/CONTRAST: CPT

## 2025-08-18 PROCEDURE — 85025 COMPLETE CBC W/AUTO DIFF WBC: CPT

## 2025-08-18 PROCEDURE — 83690 ASSAY OF LIPASE: CPT

## 2025-08-18 PROCEDURE — 87086 URINE CULTURE/COLONY COUNT: CPT

## 2025-08-18 PROCEDURE — 87077 CULTURE AEROBIC IDENTIFY: CPT

## 2025-08-18 PROCEDURE — 6360000004 HC RX CONTRAST MEDICATION: Performed by: RADIOLOGY

## 2025-08-18 RX ORDER — ONDANSETRON 2 MG/ML
4 INJECTION INTRAMUSCULAR; INTRAVENOUS ONCE
Status: COMPLETED | OUTPATIENT
Start: 2025-08-18 | End: 2025-08-18

## 2025-08-18 RX ORDER — FENTANYL CITRATE 50 UG/ML
50 INJECTION, SOLUTION INTRAMUSCULAR; INTRAVENOUS ONCE
Status: COMPLETED | OUTPATIENT
Start: 2025-08-18 | End: 2025-08-18

## 2025-08-18 RX ORDER — IOPAMIDOL 755 MG/ML
75 INJECTION, SOLUTION INTRAVASCULAR
Status: COMPLETED | OUTPATIENT
Start: 2025-08-18 | End: 2025-08-18

## 2025-08-18 RX ORDER — KETOROLAC TROMETHAMINE 15 MG/ML
15 INJECTION, SOLUTION INTRAMUSCULAR; INTRAVENOUS ONCE
Status: COMPLETED | OUTPATIENT
Start: 2025-08-18 | End: 2025-08-18

## 2025-08-18 RX ADMIN — ONDANSETRON 4 MG: 2 INJECTION, SOLUTION INTRAMUSCULAR; INTRAVENOUS at 18:51

## 2025-08-18 RX ADMIN — KETOROLAC TROMETHAMINE 15 MG: 15 INJECTION, SOLUTION INTRAMUSCULAR; INTRAVENOUS at 22:20

## 2025-08-18 RX ADMIN — IOPAMIDOL 75 ML: 755 INJECTION, SOLUTION INTRAVENOUS at 19:22

## 2025-08-18 RX ADMIN — FENTANYL CITRATE 50 MCG: 50 INJECTION INTRAMUSCULAR; INTRAVENOUS at 18:51

## 2025-08-18 ASSESSMENT — LIFESTYLE VARIABLES
HOW MANY STANDARD DRINKS CONTAINING ALCOHOL DO YOU HAVE ON A TYPICAL DAY: 1 OR 2
HOW OFTEN DO YOU HAVE A DRINK CONTAINING ALCOHOL: 2-4 TIMES A MONTH

## 2025-08-18 ASSESSMENT — PAIN SCALES - GENERAL
PAINLEVEL_OUTOF10: 10
PAINLEVEL_OUTOF10: 9

## 2025-08-18 ASSESSMENT — PAIN - FUNCTIONAL ASSESSMENT: PAIN_FUNCTIONAL_ASSESSMENT: 0-10

## 2025-08-20 LAB
EKG ATRIAL RATE: 71 BPM
EKG P AXIS: 66 DEGREES
EKG P-R INTERVAL: 124 MS
EKG Q-T INTERVAL: 406 MS
EKG QRS DURATION: 70 MS
EKG QTC CALCULATION (BAZETT): 441 MS
EKG R AXIS: 66 DEGREES
EKG T AXIS: 55 DEGREES
EKG VENTRICULAR RATE: 71 BPM

## 2025-08-20 PROCEDURE — 93010 ELECTROCARDIOGRAM REPORT: CPT | Performed by: INTERNAL MEDICINE

## 2025-08-21 LAB
MICROORGANISM SPEC CULT: ABNORMAL
SERVICE CMNT-IMP: ABNORMAL
SPECIMEN DESCRIPTION: ABNORMAL